# Patient Record
Sex: FEMALE | Race: WHITE | NOT HISPANIC OR LATINO | Employment: FULL TIME | URBAN - METROPOLITAN AREA
[De-identification: names, ages, dates, MRNs, and addresses within clinical notes are randomized per-mention and may not be internally consistent; named-entity substitution may affect disease eponyms.]

---

## 2017-01-03 DIAGNOSIS — Z00.00 ENCOUNTER FOR ROUTINE ADULT HEALTH EXAMINATION WITHOUT ABNORMAL FINDINGS: Primary | ICD-10-CM

## 2017-01-03 RX ORDER — NORETHINDRONE ACETATE AND ETHINYL ESTRADIOL .02; 1 MG/1; MG/1
1 TABLET ORAL DAILY
Qty: 84 TABLET | Refills: 0 | Status: SHIPPED | OUTPATIENT
Start: 2017-01-03 | End: 2017-01-19

## 2017-01-03 NOTE — TELEPHONE ENCOUNTER
Patient is overdue for OV for AFE.   No pending appointments at this time.  Per RN protocol, can only give 30 day supply. Patient normally receives 90 day supply from mail order pharmacy.     To provider to advise if ok to fill 90 and send letter to patient to make above appointment.    Stella Perez RN

## 2017-01-19 DIAGNOSIS — Z00.00 ENCOUNTER FOR ROUTINE ADULT HEALTH EXAMINATION WITHOUT ABNORMAL FINDINGS: Primary | ICD-10-CM

## 2017-01-19 RX ORDER — NORETHINDRONE ACETATE AND ETHINYL ESTRADIOL .02; 1 MG/1; MG/1
1 TABLET ORAL DAILY
Qty: 84 TABLET | Refills: 0 | Status: SHIPPED | OUTPATIENT
Start: 2017-01-19 | End: 2017-06-19 | Stop reason: SINTOL

## 2017-01-31 ENCOUNTER — OFFICE VISIT (OUTPATIENT)
Dept: OBGYN | Facility: CLINIC | Age: 22
End: 2017-01-31
Payer: COMMERCIAL

## 2017-01-31 VITALS
BODY MASS INDEX: 20.3 KG/M2 | HEIGHT: 63 IN | TEMPERATURE: 98.3 F | HEART RATE: 73 BPM | WEIGHT: 114.6 LBS | SYSTOLIC BLOOD PRESSURE: 129 MMHG | DIASTOLIC BLOOD PRESSURE: 80 MMHG

## 2017-01-31 DIAGNOSIS — N64.4 MASTODYNIA: Primary | ICD-10-CM

## 2017-01-31 LAB — B-HCG SERPL-ACNC: <1 IU/L

## 2017-01-31 PROCEDURE — 36415 COLL VENOUS BLD VENIPUNCTURE: CPT | Performed by: NURSE PRACTITIONER

## 2017-01-31 PROCEDURE — 84702 CHORIONIC GONADOTROPIN TEST: CPT | Performed by: NURSE PRACTITIONER

## 2017-01-31 PROCEDURE — 99213 OFFICE O/P EST LOW 20 MIN: CPT | Performed by: NURSE PRACTITIONER

## 2017-01-31 ASSESSMENT — PAIN SCALES - GENERAL: PAINLEVEL: NO PAIN (1)

## 2017-01-31 NOTE — NURSING NOTE
"Chief Complaint   Patient presents with     Breast Pain     Bilateral x 3 weeks       Initial /80 mmHg  Pulse 73  Temp(Src) 98.3  F (36.8  C) (Oral)  Ht 5' 2.75\" (1.594 m)  Wt 114 lb 9.6 oz (51.982 kg)  BMI 20.46 kg/m2  LMP 01/09/2017 (Exact Date) Estimated body mass index is 20.46 kg/(m^2) as calculated from the following:    Height as of this encounter: 5' 2.75\" (1.594 m).    Weight as of this encounter: 114 lb 9.6 oz (51.982 kg)..  BP completed using cuff size: janet Kitchen CMA      "

## 2017-01-31 NOTE — MR AVS SNAPSHOT
"              After Visit Summary   1/31/2017    Brielle Martinez    MRN: [id number]           Patient Information     Date Of Birth          1995        Visit Information        Provider Department      1/31/2017 8:10 AM Chiquita Julien APRN CNP [facility]        Today's Diagnoses     Mastodynia    -  1        Follow-ups after your visit        Who to contact     If you have questions or need follow up information about today's clinic visit or your schedule please contact [facility] directly at [phone].  Normal or non-critical lab and imaging results will be communicated to you by Mom Trustedhart, letter or phone within 4 business days after the clinic has received the results. If you do not hear from us within 7 days, please contact the clinic through PowerPlant or phone. If you have a critical or abnormal lab result, we will notify you by phone as soon as possible.  Submit refill requests through Fanli website or call your pharmacy and they will forward the refill request to us. Please allow 3 business days for your refill to be completed.          Additional Information About Your Visit        MyChart Information     Fanli website gives you secure access to your electronic health record. If you see a primary care provider, you can also send messages to your care team and make appointments. If you have questions, please call your primary care clinic.  If you do not have a primary care provider, please call [phone] and they will assist you.        Care EveryWhere ID     This is your Care EveryWhere ID. This could be used by other organizations to access your [facility] medical records  [id number]        Your Vitals Were     Pulse Temperature Height BMI (Body Mass Index) Last Period       73 98.3  F (36.8  C) (Oral) 5' 2.75\" (1.594 m) 20.46 kg/m2 01/09/2017 (Exact Date)        Blood Pressure from Last 3 Encounters:   01/31/17 129/80   04/23/16 118/69   01/07/16 134/75    Weight from Last " 3 Encounters:   01/31/17 114 lb 9.6 oz (51.982 kg)   04/23/16 116 lb 6.4 oz (52.799 kg)   01/07/16 121 lb 6.4 oz (55.067 kg)              We Performed the Following     HCG quantitative pregnancy        Primary Care Provider Office Phone # Fax #    Phyllis Burton PA-C [phone]       [facility] [address] [facility] [address]        Thank you!     Thank you for choosing [facility]  for your care. Our goal is always to provide you with excellent care. Hearing back from our patients is one way we can continue to improve our services. Please take a few minutes to complete the written survey that you may receive in the mail after your visit with us. Thank you!             Your Updated Medication List - Protect others around you: Learn how to safely use, store and throw away your medicines at www.disposemymeds.org.          This list is accurate as of: 1/31/17  8:22 AM.  Always use your most recent med list.                   Brand Name Dispense Instructions for use    norethindrone-ethinyl estradiol 1-20 MG-MCG per tablet    MICROGESTIN 1/20    84 tablet    Take 1 tablet by mouth daily Needs appt before more refills

## 2017-01-31 NOTE — PROGRESS NOTES
S: Brielle is a 21 year old  0 presenting today with concerns of bilateral breast tenderness and heaviness x 3-4 weeks. Both breasts feel the same. Started the week prior to her last menses on 17. Describes as heavy, full feeling. Possible slight increase in size. Denies lumps, masses, lymphadenopathy, nipple discharge. Drinks 1 cup coffee and a few cups green tea daily. Non smoker. She eats healthy and exercise 5 times weekly and this has not changed. Has been on the same pills for a long time. No recent missed or late pills. No recent antibiotic use. Patient did have bleeding again 17-1 day normal flow and then brown spotting. Never happened to her in the past. Is also having fatigue, feels more cramping. Last negative home pregnancy test was 1 week ago. Patient medical, surgical, social, and family history reviewed and updated at today's visit. ROS: 10 point ROS neg other than the symptoms noted above in the HPI.    O: This is a well appearing female in no acute distress. Answers questions and maintains eye contact appropriately. Vital signs noted.  CV: Regular rate and rhythm without murmur, gallop, rub  RESPIRATORY: Clear to auscultation bilaterally.  BREAST: Breasts are symmetrical without masses, lymphadenopathy, puckering, retraction, or nipple discharge bilaterally.    A/P:  (N64.4) Mastodynia  (primary encounter diagnosis)  Comment: Patient would like pregnancy test for reassurance. We discussed alternate possible etiologies of her symptoms as well. Recommend Vitamin E tablets for the next 4-8 weeks. Also, may consider getting fitted for a bra to ensure she has the correct size, especially if she feels the breasts are growing. Limit caffeine, increase water. May need to wear bra at night time as well. Patient will then monitor and see if it improves after her next cycle. I will notify her of the HCG results.  Plan: HCG quantitative pregnancy          Chiquita TREVIZO CNP

## 2017-06-19 ENCOUNTER — OFFICE VISIT (OUTPATIENT)
Dept: FAMILY MEDICINE | Facility: CLINIC | Age: 22
End: 2017-06-19
Payer: COMMERCIAL

## 2017-06-19 VITALS
HEART RATE: 78 BPM | TEMPERATURE: 98.4 F | BODY MASS INDEX: 20.53 KG/M2 | WEIGHT: 115 LBS | SYSTOLIC BLOOD PRESSURE: 122 MMHG | DIASTOLIC BLOOD PRESSURE: 79 MMHG

## 2017-06-19 DIAGNOSIS — N92.1 BREAKTHROUGH BLEEDING ON BIRTH CONTROL PILLS: Primary | ICD-10-CM

## 2017-06-19 DIAGNOSIS — N94.10 DYSPAREUNIA IN FEMALE: ICD-10-CM

## 2017-06-19 LAB
BETA HCG QUAL IFA URINE: NEGATIVE
MICRO REPORT STATUS: NORMAL
SPECIMEN SOURCE: NORMAL
WET PREP SPEC: NORMAL

## 2017-06-19 PROCEDURE — 87210 SMEAR WET MOUNT SALINE/INK: CPT | Performed by: PHYSICIAN ASSISTANT

## 2017-06-19 PROCEDURE — 84703 CHORIONIC GONADOTROPIN ASSAY: CPT | Performed by: PHYSICIAN ASSISTANT

## 2017-06-19 PROCEDURE — 87491 CHLMYD TRACH DNA AMP PROBE: CPT | Performed by: PHYSICIAN ASSISTANT

## 2017-06-19 PROCEDURE — 99213 OFFICE O/P EST LOW 20 MIN: CPT | Performed by: PHYSICIAN ASSISTANT

## 2017-06-19 PROCEDURE — 87591 N.GONORRHOEAE DNA AMP PROB: CPT | Performed by: PHYSICIAN ASSISTANT

## 2017-06-19 RX ORDER — LEVONORGESTREL AND ETHINYL ESTRADIOL 0.15-0.03
1 KIT ORAL DAILY
Qty: 84 TABLET | Refills: 3 | Status: SHIPPED | OUTPATIENT
Start: 2017-06-19 | End: 2018-03-28

## 2017-06-19 NOTE — MR AVS SNAPSHOT
After Visit Summary   6/19/2017    Brielle Martinez    MRN: [id number]           Patient Information     Date Of Birth          1995        Visit Information        Provider Department      6/19/2017 12:20 PM Kehr, Kristen M, PA-C [facility]        Today's Diagnoses     Breakthrough bleeding on birth control pills    -  1    Dyspareunia in female           Follow-ups after your visit        Who to contact     If you have questions or need follow up information about today's clinic visit or your schedule please contact [facility] directly at [phone].  Normal or non-critical lab and imaging results will be communicated to you by DocTreehart, letter or phone within 4 business days after the clinic has received the results. If you do not hear from us within 7 days, please contact the clinic through IntelliMatt or phone. If you have a critical or abnormal lab result, we will notify you by phone as soon as possible.  Submit refill requests through Etherstack or call your pharmacy and they will forward the refill request to us. Please allow 3 business days for your refill to be completed.          Additional Information About Your Visit        MyChart Information     Etherstack gives you secure access to your electronic health record. If you see a primary care provider, you can also send messages to your care team and make appointments. If you have questions, please call your primary care clinic.  If you do not have a primary care provider, please call [phone] and they will assist you.        Care EveryWhere ID     This is your Care EveryWhere ID. This could be used by other organizations to access your [facility] medical records  [id number]        Your Vitals Were     Pulse Temperature BMI (Body Mass Index)             78 98.4  F (36.9  C) (Oral) 20.53 kg/m2          Blood Pressure from Last 3 Encounters:   06/19/17 122/79   01/31/17 129/80   04/23/16 118/69    Weight from Last 3  Encounters:   06/19/17 115 lb (52.2 kg)   01/31/17 114 lb 9.6 oz (52 kg)   04/23/16 116 lb 6.4 oz (52.8 kg)              We Performed the Following     Beta HCG qual IFA urine     Chlamydia trachomatis PCR     Neisseria gonorrhoeae PCR     Wet prep          Today's Medication Changes          These changes are accurate as of: 6/19/17  2:32 PM.  If you have any questions, ask your nurse or doctor.               Start taking these medicines.        Dose/Directions    levonorgestrel-ethinyl estradiol 0.15-30 MG-MCG per tablet   Commonly known as:  NORDETTE   Used for:  Breakthrough bleeding on birth control pills   Started by:  Kehr, Kristen M, PA-C        Dose:  1 tablet   Take 1 tablet by mouth daily   Quantity:  84 tablet   Refills:  3         Stop taking these medicines if you haven't already. Please contact your care team if you have questions.     norethindrone-ethinyl estradiol 1-20 MG-MCG per tablet   Commonly known as:  MICROGESTIN 1/20   Stopped by:  Kehr, Kristen M, PA-C                Where to get your medicines      These medications were sent to [facility] [address], [facility] [address]  [id number] [place] [address]     Phone:  [phone]     levonorgestrel-ethinyl estradiol 0.15-30 MG-MCG per tablet                Primary Care Provider Office Phone # Fax #    Phyllis Burton PA-C [phone]       [address] [address]        Thank you!     Thank you for choosing [facility]  for your care. Our goal is always to provide you with excellent care. Hearing back from our patients is one way we can continue to improve our services. Please take a few minutes to complete the written survey that you may receive in the mail after your visit with us. Thank you!             Your Updated Medication List - Protect others around you: Learn how to safely use, store and throw away your medicines  at www.disposemymeds.org.          This list is accurate as of: 6/19/17  2:32 PM.  Always use your most recent med list.                   Brand Name Dispense Instructions for use    levonorgestrel-ethinyl estradiol 0.15-30 MG-MCG per tablet    FARRAH    84 tablet    Take 1 tablet by mouth daily

## 2017-06-19 NOTE — NURSING NOTE
"Chief Complaint   Patient presents with     Abnormal Bleeding Problem     discuss menstrual        Initial /79 (Cuff Size: Adult Regular)  Pulse 78  Temp 98.4  F (36.9  C) (Oral)  Wt 115 lb (52.2 kg)  BMI 20.53 kg/m2 Estimated body mass index is 20.53 kg/(m^2) as calculated from the following:    Height as of 1/31/17: 5' 2.75\" (1.594 m).    Weight as of this encounter: 115 lb (52.2 kg).  Medication Reconciliation: complete    ROSAURA Mcelroy MA    "

## 2017-06-19 NOTE — PROGRESS NOTES
SUBJECTIVE:                                                    Brielle Martinez is a 21 year old female who presents to clinic today for the following health issues:      Discuss menstrual- LMP was 10 days ago, patient is currently spotting.   She has been on her current dose of contraception for 2 years. She will have some spotting from time to time, but never this long. If she does have spotting, it is during the active pills. She then does not have a normal menstrual cycle. She has not missed a dose of the contraception. She is sexually active. She took a home HCG test a week ago, it was negative.   She also has some discomfort with intercourse. No other discharge. No dysuria.       Problem list and histories reviewed & adjusted, as indicated.  Additional history: as documented    Patient Active Problem List   Diagnosis     Contraception     Posttraumatic stress disorder, provisional     CARDIOVASCULAR SCREENING; LDL GOAL LESS THAN 160     Acne     Past Surgical History:   Procedure Laterality Date     NO HISTORY OF SURGERY         Social History   Substance Use Topics     Smoking status: Never Smoker     Smokeless tobacco: Never Used     Alcohol use Yes     Family History   Problem Relation Age of Onset     Hypertension Paternal Grandmother      Psychotic Disorder Sister      anxiety     Asthma Sister      Exercise induced asthma     Other Cancer Maternal Grandmother      great grandma cervical cancer         Current Outpatient Prescriptions   Medication Sig Dispense Refill     norethindrone-ethinyl estradiol (MICROGESTIN 1/20) 1-20 MG-MCG per tablet Take 1 tablet by mouth daily Needs appt before more refills 84 tablet 0     Allergies   Allergen Reactions     Keflex [Cephalexin]        Reviewed and updated as needed this visit by clinical staff  Tobacco  Allergies  Meds  Med Hx  Surg Hx  Fam Hx  Soc Hx      Reviewed and updated as needed this visit by Provider         ROS:  Constitutional, HEENT,  cardiovascular, pulmonary, gi and gu systems are negative, except as otherwise noted.    OBJECTIVE:                                                    /79 (Cuff Size: Adult Regular)  Pulse 78  Temp 98.4  F (36.9  C) (Oral)  Wt 115 lb (52.2 kg)  BMI 20.53 kg/m2  Body mass index is 20.53 kg/(m^2).  GENERAL: healthy, alert and no distress   (female): normal female external genitalia, normal urethral meatus , vaginal mucosa pink, moist, well rugated, normal cervix, adnexae, and uterus without masses. Brown discharge present  MS: no gross musculoskeletal defects noted, no edema  SKIN: no suspicious lesions or rashes  PSYCH: mentation appears normal, affect normal/bright    Diagnostic Test Results:  Results for orders placed or performed in visit on 06/19/17 (from the past 24 hour(s))   Wet prep   Result Value Ref Range    Specimen Description Vagina     Wet Prep       No Trichomonas seen  No yeast seen  No clue cells seen      Micro Report Status FINAL 06/19/2017    Beta HCG qual IFA urine   Result Value Ref Range    Beta HCG Qual IFA Urine Negative NEG        ASSESSMENT/PLAN:                                                      1. Breakthrough bleeding on birth control pills  2. Dyspareunia in female  New oral contraception.  UPT is negative.   Wet prep is negative.   GC / chlamydia tests pending.   I will contact her via Saunders Solutionst if all negative, otherwise we will contact her via telephone.   - Beta HCG qual IFA urine  - levonorgestrel-ethinyl estradiol (NORDETTE) 0.15-30 MG-MCG per tablet; Take 1 tablet by mouth daily  Dispense: 84 tablet; Refill: 3  - Wet prep  - Chlamydia trachomatis PCR  - Neisseria gonorrhoeae PCR      Kristen M. Kehr, PA-C  [facility]

## 2017-06-20 LAB
C TRACH DNA SPEC QL NAA+PROBE: NORMAL
N GONORRHOEA DNA SPEC QL NAA+PROBE: NORMAL
SPECIMEN SOURCE: NORMAL
SPECIMEN SOURCE: NORMAL

## 2018-02-15 ENCOUNTER — OFFICE VISIT (OUTPATIENT)
Dept: FAMILY MEDICINE | Facility: CLINIC | Age: 23
End: 2018-02-15
Payer: COMMERCIAL

## 2018-02-15 VITALS
WEIGHT: 111 LBS | HEART RATE: 106 BPM | SYSTOLIC BLOOD PRESSURE: 131 MMHG | BODY MASS INDEX: 19.82 KG/M2 | TEMPERATURE: 100.9 F | OXYGEN SATURATION: 97 % | DIASTOLIC BLOOD PRESSURE: 81 MMHG

## 2018-02-15 DIAGNOSIS — R68.89 FLU-LIKE SYMPTOMS: Primary | ICD-10-CM

## 2018-02-15 PROCEDURE — 99213 OFFICE O/P EST LOW 20 MIN: CPT | Performed by: FAMILY MEDICINE

## 2018-02-15 RX ORDER — OSELTAMIVIR PHOSPHATE 75 MG/1
75 CAPSULE ORAL 2 TIMES DAILY
Qty: 10 CAPSULE | Refills: 0 | Status: SHIPPED | OUTPATIENT
Start: 2018-02-15 | End: 2018-03-28

## 2018-02-15 NOTE — PROGRESS NOTES
SUBJECTIVE:   Brielle Martinez is a 22 year old female who presents to clinic today for the following health issues:      RESPIRATORY SYMPTOMS      Duration: 2 days    Description  nasal congestion, sore throat, cough, fever, chills, headache, fatigue/malaise and myalgias    Severity: moderate    Accompanying signs and symptoms: None    History (predisposing factors):  none    Precipitating or alleviating factors: None    Therapies tried and outcome:  rest and fluids multi symptom cold and flu    ptsd stable. No concerns per patient. No thoughts of harming self or others.  2 days ago started having sweats nauseous headache and coughing   Woke up worse today  No chest pain no shortness of breath  Mild sore throat she states from coughing  Chest pain or exertional shortness of breath: NO   Exposure to pertussis or pertussis like symptoms: No  Orthopnea, worsening edema, pnd: NO  Rash: NO  Tried OTC medications without relief  No hemoptysis.  Worsening symptoms hence patient came in to be seen     Problem list and histories reviewed & adjusted, as indicated.  Additional history: as documented    Problem list, Medication list, Allergies, and Medical/Social/Surgical histories reviewed in [facility] and updated as appropriate.    ROS:  Constitutional, HEENT, cardiovascular, pulmonary, gi and gu systems are negative, except as otherwise noted.    OBJECTIVE:                                                    /81  Pulse 106  Temp 100.9  F (38.3  C) (Oral)  Wt 111 lb (50.3 kg)  SpO2 97%  BMI 19.82 kg/m2  Body mass index is 19.82 kg/(m^2).  GENERAL: healthy, alert and no distress  EYES: pink palpebral conjunctiva, anicteric sclera  ENT: midline nasal septum normal ear exam. Normal   sinuses.   Mouth: moist buccal mucosa nonhyperemic posterior pharyngeal wall. No tonsillar enlargement or cellulitis  NECK: no adenopathy, no asymmetry, masses, or scars and thyroid normal to palpation  RESP: lungs clear to auscultation - No   rales, rhonchi or wheezes    CV: regular rate and rhythm, normal S1 S2, no S3 or S4,  No murmurs, click or rub  SKIN: no visible rashes noted  Pscyh: Appropriate mood and affect  MS: no gross musculoskeletal defects noted  Neuro: no meningeal signs     Diagnostic Test Results:  none      ASSESSMENT/PLAN:                                                        ICD-10-CM    1. Flu-like symptoms R68.89 Rapid strep screen     oseltamivir (TAMIFLU) 75 MG capsule     Offered strep test patient declined.   Prescribed with tamiflu  Risks vs benefits discussed  Alarm signs or symptoms discussed, if present recommend go to ER   Some flu like symptoms. Stay home until fever free for 24 hours.   Advised that prolonged cough > 3 weeks recommend chest xray, offered today, declined.   Adverse reactions of medications discussed.  Over the counter medications discussed.   Aware to come back in if with worsening symptoms or if no relief despite treatment plan  Patient voiced understanding and had no further questions.     MD Babs Cummins MD  [facility]

## 2018-02-15 NOTE — LETTER
[facility]  [address] [facility] [address]  Phone: [phone]    February 15, 2018        Brielle Martinez  [address] [facility] [address]          To whom it may concern:    RE: Brielle Martinez    Patient was seen and treated today at our clinic and missed work.  Patient needs to stay home until no more fevers for 24 hours without the help of tylenol or ibuprofen.  Patient may be sick for an additional 4-6 days.       Please contact me for questions or concerns.      Sincerely,        Babs Lewis MD

## 2018-02-15 NOTE — MR AVS SNAPSHOT
After Visit Summary   2/15/2018    Brielle Martinez    MRN: [id number]           Patient Information     Date Of Birth          1995        Visit Information        Provider Department      2/15/2018 11:00 AM Babs Lewis MD [facility]        Today's Diagnoses     Flu-like symptoms    -  1       Follow-ups after your visit        Who to contact     If you have questions or need follow up information about today's clinic visit or your schedule please contact [facility] directly at [phone].  Normal or non-critical lab and imaging results will be communicated to you by MyChart, letter or phone within 4 business days after the clinic has received the results. If you do not hear from us within 7 days, please contact the clinic through Campus Diariest or phone. If you have a critical or abnormal lab result, we will notify you by phone as soon as possible.  Submit refill requests through Joss Technology or call your pharmacy and they will forward the refill request to us. Please allow 3 business days for your refill to be completed.          Additional Information About Your Visit        MyChart Information     Joss Technology gives you secure access to your electronic health record. If you see a primary care provider, you can also send messages to your care team and make appointments. If you have questions, please call your primary care clinic.  If you do not have a primary care provider, please call [phone] and they will assist you.        Care EveryWhere ID     This is your Care EveryWhere ID. This could be used by other organizations to access your [facility] medical records  [id number]        Your Vitals Were     Pulse Temperature Pulse Oximetry BMI (Body Mass Index)          106 100.9  F (38.3  C) (Oral) 97% 19.82 kg/m2         Blood Pressure from Last 3 Encounters:   02/15/18 131/81   06/19/17 122/79   01/31/17 129/80    Weight from Last 3 Encounters:   02/15/18 111 lb  (50.3 kg)   06/19/17 115 lb (52.2 kg)   01/31/17 114 lb 9.6 oz (52 kg)              We Performed the Following     Rapid strep screen          Today's Medication Changes          These changes are accurate as of 2/15/18 11:43 AM.  If you have any questions, ask your nurse or doctor.               Start taking these medicines.        Dose/Directions    oseltamivir 75 MG capsule   Commonly known as:  TAMIFLU   Used for:  Flu-like symptoms   Started by:  Babs Lewis MD        Dose:  75 mg   Take 1 capsule (75 mg) by mouth 2 times daily   Quantity:  10 capsule   Refills:  0            Where to get your medicines      These medications were sent to [facility] [address] [facility], [facility] [address]  [address] [address], [facility] [address]     Phone:  [phone]     oseltamivir 75 MG capsule                Primary Care Provider Office Phone # Fax #    Phyllis Burton PA-C [phone] [address] [facility] [address]        Equal Access to Services     [facility]: Hadii chelita briscoe hadasho Sonaeem, waaxda luqadaha, qaybta kaalmada adeabbyyanils, brenda quintero . So [facility] [phone].    ATENCIÓN: Si habla español, tiene a caban disposición servicios gratuitos de asistencia lingüística. Llame al [phone].    We comply with applicable federal civil rights laws and [place] laws. We do not discriminate on the basis of race, color, national origin, age, disability, sex, sexual orientation, or gender identity.            Thank you!     Thank you for choosing [facility]  for your care. Our goal is always to provide you with excellent care. Hearing back from our patients is one way we can continue to improve our services. Please take a few minutes to complete the written survey that you may receive in the mail after your visit with us. Thank you!             Your Updated Medication List - Protect others around you:  Learn how to safely use, store and throw away your medicines at www.disposemymeds.org.          This list is accurate as of 2/15/18 11:43 AM.  Always use your most recent med list.                   Brand Name Dispense Instructions for use Diagnosis    levonorgestrel-ethinyl estradiol 0.15-30 MG-MCG per tablet    NORDETTE    84 tablet    Take 1 tablet by mouth daily    Breakthrough bleeding on birth control pills       oseltamivir 75 MG capsule    TAMIFLU    10 capsule    Take 1 capsule (75 mg) by mouth 2 times daily    Flu-like symptoms

## 2018-03-28 ENCOUNTER — OFFICE VISIT (OUTPATIENT)
Dept: FAMILY MEDICINE | Facility: CLINIC | Age: 23
End: 2018-03-28
Payer: COMMERCIAL

## 2018-03-28 VITALS
HEART RATE: 85 BPM | OXYGEN SATURATION: 99 % | BODY MASS INDEX: 20 KG/M2 | DIASTOLIC BLOOD PRESSURE: 71 MMHG | SYSTOLIC BLOOD PRESSURE: 112 MMHG | RESPIRATION RATE: 16 BRPM | WEIGHT: 112 LBS | TEMPERATURE: 97.8 F

## 2018-03-28 DIAGNOSIS — J06.9 VIRAL URI: Primary | ICD-10-CM

## 2018-03-28 DIAGNOSIS — Z30.41 ENCOUNTER FOR SURVEILLANCE OF CONTRACEPTIVE PILLS: ICD-10-CM

## 2018-03-28 DIAGNOSIS — R07.0 THROAT PAIN: ICD-10-CM

## 2018-03-28 LAB
DEPRECATED S PYO AG THROAT QL EIA: NORMAL
SPECIMEN SOURCE: NORMAL

## 2018-03-28 PROCEDURE — 87081 CULTURE SCREEN ONLY: CPT | Performed by: PHYSICIAN ASSISTANT

## 2018-03-28 PROCEDURE — 87880 STREP A ASSAY W/OPTIC: CPT | Performed by: PHYSICIAN ASSISTANT

## 2018-03-28 PROCEDURE — 99213 OFFICE O/P EST LOW 20 MIN: CPT | Performed by: PHYSICIAN ASSISTANT

## 2018-03-28 RX ORDER — LEVONORGESTREL AND ETHINYL ESTRADIOL 0.15-0.03
1 KIT ORAL DAILY
Qty: 84 TABLET | Refills: 3 | Status: SHIPPED | OUTPATIENT
Start: 2018-03-28 | End: 2019-02-08

## 2018-03-28 ASSESSMENT — PAIN SCALES - GENERAL: PAINLEVEL: NO PAIN (0)

## 2018-03-28 NOTE — MR AVS SNAPSHOT
After Visit Summary   3/28/2018    Brielle Martinez    MRN: [id number]           Patient Information     Date Of Birth          1995        Visit Information        Provider Department      3/28/2018 8:40 AM Kehr, Kristen M, PA-C [facility]        Today's Diagnoses     Viral URI    -  1    Encounter for surveillance of contraceptive pills        Throat pain           Follow-ups after your visit        Who to contact     If you have questions or need follow up information about today's clinic visit or your schedule please contact [facility] directly at [phone].  Normal or non-critical lab and imaging results will be communicated to you by Scouponhart, letter or phone within 4 business days after the clinic has received the results. If you do not hear from us within 7 days, please contact the clinic through CO-Valuet or phone. If you have a critical or abnormal lab result, we will notify you by phone as soon as possible.  Submit refill requests through Sprig or call your pharmacy and they will forward the refill request to us. Please allow 3 business days for your refill to be completed.          Additional Information About Your Visit        MyChart Information     Sprig gives you secure access to your electronic health record. If you see a primary care provider, you can also send messages to your care team and make appointments. If you have questions, please call your primary care clinic.  If you do not have a primary care provider, please call [phone] and they will assist you.        Care EveryWhere ID     This is your Care EveryWhere ID. This could be used by other organizations to access your [facility] medical records  [id number]        Your Vitals Were     Pulse Temperature Respirations Pulse Oximetry BMI (Body Mass Index)       85 97.8  F (36.6  C) (Oral) 16 99% 20 kg/m2        Blood Pressure from Last 3 Encounters:   03/28/18 112/71   02/15/18 131/81    06/19/17 122/79    Weight from Last 3 Encounters:   03/28/18 112 lb (50.8 kg)   02/15/18 111 lb (50.3 kg)   06/19/17 115 lb (52.2 kg)              We Performed the Following     Beta strep group A culture     Strep, Rapid Screen          Where to get your medicines      These medications were sent to [facility] - [place], [place] - [address]  [address] [address]     Phone:  [phone]     levonorgestrel-ethinyl estradiol 0.15-30 MG-MCG per tablet          Primary Care Provider Office Phone # Fax #    Phyllis Burton PA-C [phone] [address] [address] [place] [address]        Equal Access to Services     ARLET CONDE : Rc rodrigues Sonaeem, waaxda luqadaha, qaybta kaalmada ankuryanils, brenda quintero . So [facility] [phone].    ATENCIÓN: Si habla español, tiene a caban disposición servicios gratuitos de asistencia lingüística. [facility] [phone].    We comply with applicable federal civil rights laws and [place] laws. We do not discriminate on the basis of race, color, national origin, age, disability, sex, sexual orientation, or gender identity.            Thank you!     Thank you for choosing [facility]  for your care. Our goal is always to provide you with excellent care. Hearing back from our patients is one way we can continue to improve our services. Please take a few minutes to complete the written survey that you may receive in the mail after your visit with us. Thank you!             Your Updated Medication List - Protect others around you: Learn how to safely use, store and throw away your medicines at www.disposemymeds.org.          This list is accurate as of 3/28/18  9:14 AM.  Always use your most recent med list.                   Brand Name Dispense Instructions for use Diagnosis    levonorgestrel-ethinyl estradiol 0.15-30 MG-MCG per tablet    NORDETTE    84 tablet     Take 1 tablet by mouth daily    Encounter for surveillance of contraceptive pills

## 2018-03-28 NOTE — NURSING NOTE
"Chief Complaint   Patient presents with     Pharyngitis       Initial /71  Pulse 85  Temp 97.8  F (36.6  C) (Oral)  Resp 16  Wt 112 lb (50.8 kg)  SpO2 99%  BMI 20 kg/m2 Estimated body mass index is 20 kg/(m^2) as calculated from the following:    Height as of 1/31/17: 5' 2.75\" (1.594 m).    Weight as of this encounter: 112 lb (50.8 kg).  Medication Reconciliation: complete    ROSAURA Mcelroy MA    "

## 2018-03-28 NOTE — PROGRESS NOTES
SUBJECTIVE:   Brielle Martinez is a 22 year old female who presents to clinic today for the following health issues:      ENT Symptoms             Symptoms: cc Present Absent Comment   Fever/Chills  x  chills   Fatigue  x     Muscle Aches  x     Eye Irritation   x    Sneezing   x    Nasal Kike/Drg   x    Sinus Pressure/Pain  x     Loss of smell   x    Dental pain   x    Sore Throat  x     Swollen Glands  x     Ear Pain/Fullness  x  Left ear   Cough   x    Wheeze   x    Chest Pain   x    Shortness of breath  x     Rash   x    Other  x  headaches     Symptom duration:  2 days   Symptom severity:  severe   Treatments tried:  n/a   Contacts:           PROBLEMS TO ADD ON...  She needs to have a new prescription for her contraception at [facility]. Her insurance will not longer allow her to have the prescription filled at [facility] without charging her.     Problem list and histories reviewed & adjusted, as indicated.  Additional history: as documented      Current Outpatient Prescriptions   Medication Sig Dispense Refill     levonorgestrel-ethinyl estradiol (NORDETTE) 0.15-30 MG-MCG per tablet Take 1 tablet by mouth daily 84 tablet 3     [DISCONTINUED] levonorgestrel-ethinyl estradiol (NORDETTE) 0.15-30 MG-MCG per tablet Take 1 tablet by mouth daily 84 tablet 3     Allergies   Allergen Reactions     Keflex [Cephalexin]        Reviewed and updated as needed this visit by clinical staff       Reviewed and updated as needed this visit by Provider         ROS:  Constitutional, HEENT, cardiovascular, pulmonary, gi and gu systems are negative, except as otherwise noted.    OBJECTIVE:     /71  Pulse 85  Temp 97.8  F (36.6  C) (Oral)  Resp 16  Wt 112 lb (50.8 kg)  SpO2 99%  BMI 20 kg/m2  Body mass index is 20 kg/(m^2).  GENERAL: healthy, alert and no distress  EYES: Eyes grossly normal to inspection, PERRL and conjunctivae and sclerae normal  HENT: normal cephalic/atraumatic, ear canals and TM's normal, nose  and mouth without ulcers or lesions, oral mucous membranes moist and tonsillar erythema  NECK: no adenopathy, no asymmetry, masses, or scars and thyroid normal to palpation  RESP: lungs clear to auscultation - no rales, rhonchi or wheezes  CV: regular rate and rhythm, normal S1 S2, no S3 or S4, no murmur, click or rub, no peripheral edema and peripheral pulses strong  MS: no gross musculoskeletal defects noted, no edema  SKIN: no suspicious lesions or rashes  NEURO: Normal strength and tone, mentation intact and speech normal  PSYCH: mentation appears normal, affect normal/bright    Diagnostic Test Results:  Results for orders placed or performed in visit on 03/28/18 (from the past 24 hour(s))   Strep, Rapid Screen   Result Value Ref Range    Specimen Description Throat     Rapid Strep A Screen       NEGATIVE: No Group A streptococcal antigen detected by immunoassay, await culture report.       ASSESSMENT/PLAN:       1. Viral URI  Continue with symptomatic treatments with OTC medications also, rest and fluids.   Rapid strep screen is negative.   [facility] if symptoms worsen or persist.     2. Encounter for surveillance of contraceptive pills  Refills sent to [facility].   - levonorgestrel-ethinyl estradiol (NORDETTE) 0.15-30 MG-MCG per tablet; Take 1 tablet by mouth daily  Dispense: 84 tablet; Refill: 3      3. Throat pain  See above  - Strep, Rapid Screen  - Beta strep group A culture    Kristen M. Kehr, PA-C  [facility]

## 2018-03-29 LAB
BACTERIA SPEC CULT: NORMAL
SPECIMEN SOURCE: NORMAL

## 2018-04-01 ENCOUNTER — E-VISIT (OUTPATIENT)
Dept: FAMILY MEDICINE | Facility: CLINIC | Age: 23
End: 2018-04-01
Payer: COMMERCIAL

## 2018-04-01 DIAGNOSIS — J01.01 ACUTE RECURRENT MAXILLARY SINUSITIS: Primary | ICD-10-CM

## 2018-04-01 DIAGNOSIS — B37.31 CANDIDIASIS OF VULVA AND VAGINA: ICD-10-CM

## 2018-04-01 PROCEDURE — 99444 ZZC PHYSICIAN ONLINE EVALUATION & MANAGEMENT SERVICE: CPT | Performed by: PHYSICIAN ASSISTANT

## 2018-04-02 RX ORDER — FLUCONAZOLE 150 MG/1
150 TABLET ORAL ONCE
Qty: 1 TABLET | Refills: 0 | Status: SHIPPED | OUTPATIENT
Start: 2018-04-02 | End: 2019-07-12

## 2018-04-26 ENCOUNTER — MYC MEDICAL ADVICE (OUTPATIENT)
Dept: FAMILY MEDICINE | Facility: CLINIC | Age: 23
End: 2018-04-26

## 2018-06-08 NOTE — PATIENT INSTRUCTIONS
Please return fasting for cholesterol recheck, you can make a lab only appointment for this.  No food or drink other than water for 10 hours.          Preventive Health Recommendations  Female Ages 18 to 25     Yearly exam:     See your health care provider every year in order to  o Review health changes.   o Discuss preventive care.    o Review your medicines if your doctor has prescribed any.      You should be tested each year for STDs (sexually transmitted diseases).       After age 20, talk to your provider about how often you should have cholesterol testing.      Starting at age 21, get a Pap test every three years. If you have an abnormal result, your doctor may have you test more often.      If you are at risk for diabetes, you should have a diabetes test (fasting glucose).     Shots:     Get a flu shot each year.     Get a tetanus shot every 10 years.     Consider getting the shot (vaccine) that prevents cervical cancer (Gardasil).    Nutrition:     Eat at least 5 servings of fruits and vegetables each day.    Eat whole-grain bread, whole-wheat pasta and brown rice instead of white grains and rice.    Talk to your provider about Calcium and Vitamin D.     Lifestyle    Exercise at least 150 minutes a week each week (30 minutes a day, 5 days a week). This will help you control your weight and prevent disease.    Limit alcohol to one drink per day.    No smoking.     Wear sunscreen to prevent skin cancer.    See your dentist every six months for an exam and cleaning.

## 2018-06-08 NOTE — PROGRESS NOTES
SUBJECTIVE:   CC: Brielle Martinez is an 22 year old woman who presents for preventive health visit.     Healthy Habits:    Answers for HPI/ROS submitted by the patient on 6/26/2018   Annual Exam:  Getting at least 3 servings of Calcium per day:: Yes  Bi-annual eye exam:: Yes  Dental care twice a year:: Yes  Sleep apnea or symptoms of sleep apnea:: None  Diet:: Regular (no restrictions)  Frequency of exercise:: 2-3 days/week  Taking medications regularly:: Yes  Medication side effects:: Not applicable  Additional concerns today:: No  PHQ-2 Score: 0  Duration of exercise:: 30-45 minutes    Periods-on oral contraceptive pill.  Periods are very light.       Due for pap this year.   Getting  this weekend.     Not fasting, will return.  Will return for tetanus also.         Today's PHQ-2 Score:   PHQ-2 ( 1999 Pf[employer]) 6/26/2018 4/26/2018   Q1: Little interest or pleasure in doing things 0 0   Q2: Feeling down, depressed or hopeless 0 0   PHQ-2 Score 0 0   Q1: Little interest or pleasure in doing things Not at all -   Q2: Feeling down, depressed or hopeless Not at all -   PHQ-2 Score 0 -       Abuse: Current or Past(Physical, Sexual or Emotional)- No  Do you feel safe in your environment - Yes    Social History   Substance Use Topics     Smoking status: Never Smoker     Smokeless tobacco: Never Used     Alcohol use Yes     If you drink alcohol do you typically have >3 drinks per day or >7 drinks per week? No                     Reviewed orders with patient.  Reviewed health maintenance and updated orders accordingly - Yes  BP Readings from Last 3 Encounters:   06/26/18 119/76   03/28/18 112/71   02/15/18 131/81    Wt Readings from Last 3 Encounters:   06/26/18 113 lb (51.3 kg)   03/28/18 112 lb (50.8 kg)   02/15/18 111 lb (50.3 kg)                  Patient Active Problem List   Diagnosis     Contraception     Posttraumatic stress disorder, provisional     CARDIOVASCULAR SCREENING; LDL GOAL LESS THAN 160     Acne      Past Surgical History:   Procedure Laterality Date     NO HISTORY OF SURGERY         Social History   Substance Use Topics     Smoking status: Never Smoker     Smokeless tobacco: Never Used     Alcohol use Yes     Family History   Problem Relation Age of Onset     Hypertension Paternal Grandmother      Psychotic Disorder Sister      anxiety     Asthma Sister      Exercise induced asthma     Other Cancer Maternal Grandmother      great grandma cervical cancer         Current Outpatient Prescriptions   Medication Sig Dispense Refill     levonorgestrel-ethinyl estradiol (NORDETTE) 0.15-30 MG-MCG per tablet Take 1 tablet by mouth daily 84 tablet 3       Mammogram not appropriate for this patient based on age.    Pertinent mammograms are reviewed under the imaging tab.  History of abnormal Pap smear: NO - age 21-29 PAP every 3 years recommended    Reviewed and updated as needed this visit by clinical staff  Tobacco  Allergies  Meds  Med Hx  Surg Hx  Fam Hx  Soc Hx        Reviewed and updated as needed this visit by Provider        Past Medical History:   Diagnosis Date     CARDIOVASCULAR SCREENING; LDL GOAL LESS THAN 160 2013     Depression with anxiety      Panic attacks      Pyelonephritis      Strawberry nevus     above (R) ear      Past Surgical History:   Procedure Laterality Date     NO HISTORY OF SURGERY       Obstetric History       T0      L0     SAB0   TAB0   Ectopic0   Multiple0   Live Births0           ROS:  CONSTITUTIONAL: NEGATIVE for fever, chills, change in weight  INTEGUMENTARU/SKIN: NEGATIVE for worrisome rashes, moles or lesions  EYES: NEGATIVE for vision changes or irritation  ENT: NEGATIVE for ear, mouth and throat problems  RESP: NEGATIVE for significant cough or SOB  BREAST: NEGATIVE for masses, tenderness or discharge  CV: NEGATIVE for chest pain, palpitations or peripheral edema  GI: NEGATIVE for nausea, abdominal pain, heartburn, or change in bowel habits  :  "NEGATIVE for unusual urinary or vaginal symptoms. Periods are regular.  MUSCULOSKELETAL: NEGATIVE for significant arthralgias or myalgia  NEURO: NEGATIVE for weakness, dizziness or paresthesias  PSYCHIATRIC: NEGATIVE for changes in mood or affect    OBJECTIVE:   /76  Pulse 70  Temp 99.1  F (37.3  C) (Oral)  Resp 14  Ht 5' 1\" (1.549 m)  Wt 113 lb (51.3 kg)  LMP 05/26/2018 (Approximate)  SpO2 99%  Breastfeeding? No  BMI 21.35 kg/m2  EXAM:  GENERAL: healthy, alert and no distress  EYES: Eyes grossly normal to inspection, PERRL and conjunctivae and sclerae normal  HENT: ear canals and TM's normal, nose and mouth without ulcers or lesions  NECK: no adenopathy, no asymmetry, masses, or scars and thyroid normal to palpation  RESP: lungs clear to auscultation - no rales, rhonchi or wheezes  BREAST: normal without masses, tenderness or nipple discharge and no palpable axillary masses or adenopathy  CV: regular rate and rhythm, normal S1 S2, no S3 or S4, no murmur, click or rub, no peripheral edema and peripheral pulses strong  ABDOMEN: soft, nontender, no hepatosplenomegaly, no masses and bowel sounds normal   (female): normal female external genitalia, normal urethral meatus, vaginal mucosa pink, moist, well rugated, and normal cervix/adnexa/uterus without masses or discharge  MS: no gross musculoskeletal defects noted, no edema  SKIN: no suspicious lesions or rashes  NEURO: Normal strength and tone, mentation intact and speech normal  PSYCH: mentation appears normal, affect normal/bright    ASSESSMENT/PLAN:   1. Encounter for routine adult health examination without abnormal findings      2. Screening examination for venereal disease    - NEISSERIA GONORRHOEA PCR  - CHLAMYDIA TRACHOMATIS PCR    3. Screening for malignant neoplasm of cervix    - Pap imaged thin layer screen only - recommended age 21 - 24 years    4. Screening for HIV (human immunodeficiency virus)      5. Need for prophylactic vaccination " "with tetanus-diphtheria (TD)      6. Screening for diabetes mellitus    - Glucose; Future    7. Lipid screening    - Lipid panel reflex to direct LDL Fasting; Future    COUNSELING:   Reviewed preventive health counseling, as reflected in patient instructions       Regular exercise       Healthy diet/nutrition       Vision screening         reports that she has never smoked. She has never used smokeless tobacco.    Estimated body mass index is 21.35 kg/(m^2) as calculated from the following:    Height as of this encounter: 5' 1\" (1.549 m).    Weight as of this encounter: 113 lb (51.3 kg).     Patient Instructions   Please return fasting for cholesterol recheck, you can make a lab only appointment for this.  No food or drink other than water for 10 hours.          Preventive Health Recommendations  Female Ages 18 to 25     Yearly exam:     See your health care provider every year in order to  o Review health changes.   o Discuss preventive care.    o Review your medicines if your doctor has prescribed any.      You should be tested each year for STDs (sexually transmitted diseases).       After age 20, talk to your provider about how often you should have cholesterol testing.      Starting at age 21, get a Pap test every three years. If you have an abnormal result, your doctor may have you test more often.      If you are at risk for diabetes, you should have a diabetes test (fasting glucose).     Shots:     Get a flu shot each year.     Get a tetanus shot every 10 years.     Consider getting the shot (vaccine) that prevents cervical cancer (Gardasil).    Nutrition:     Eat at least 5 servings of fruits and vegetables each day.    Eat whole-grain bread, whole-wheat pasta and brown rice instead of white grains and rice.    Talk to your provider about Calcium and Vitamin D.     Lifestyle    Exercise at least 150 minutes a week each week (30 minutes a day, 5 days a week). This will help you control your weight and " prevent disease.    Limit alcohol to one drink per day.    No smoking.     Wear sunscreen to prevent skin cancer.    See your dentist every six months for an exam and cleaning.          Counseling Resources:  ATP IV Guidelines  Pooled Cohorts Equation Calculator  Breast Cancer Risk Calculator  FRAX Risk Assessment  ICSI Preventive Guidelines  Dietary Guidelines for Americans, 2010  USDA's MyPlate  ASA Prophylaxis  Lung CA Screening    Phyllis Burton PA-C  [facility]

## 2018-06-26 ENCOUNTER — OFFICE VISIT (OUTPATIENT)
Dept: FAMILY MEDICINE | Facility: CLINIC | Age: 23
End: 2018-06-26
Payer: COMMERCIAL

## 2018-06-26 VITALS
SYSTOLIC BLOOD PRESSURE: 119 MMHG | DIASTOLIC BLOOD PRESSURE: 76 MMHG | RESPIRATION RATE: 14 BRPM | TEMPERATURE: 99.1 F | BODY MASS INDEX: 21.34 KG/M2 | HEIGHT: 61 IN | HEART RATE: 70 BPM | WEIGHT: 113 LBS | OXYGEN SATURATION: 99 %

## 2018-06-26 DIAGNOSIS — Z13.1 SCREENING FOR DIABETES MELLITUS: ICD-10-CM

## 2018-06-26 DIAGNOSIS — Z11.3 SCREENING EXAMINATION FOR VENEREAL DISEASE: ICD-10-CM

## 2018-06-26 DIAGNOSIS — Z00.00 ENCOUNTER FOR ROUTINE ADULT HEALTH EXAMINATION WITHOUT ABNORMAL FINDINGS: Primary | ICD-10-CM

## 2018-06-26 DIAGNOSIS — Z11.4 SCREENING FOR HIV (HUMAN IMMUNODEFICIENCY VIRUS): ICD-10-CM

## 2018-06-26 DIAGNOSIS — Z12.4 SCREENING FOR MALIGNANT NEOPLASM OF CERVIX: ICD-10-CM

## 2018-06-26 DIAGNOSIS — Z13.220 LIPID SCREENING: ICD-10-CM

## 2018-06-26 DIAGNOSIS — Z23 NEED FOR PROPHYLACTIC VACCINATION WITH TETANUS-DIPHTHERIA (TD): ICD-10-CM

## 2018-06-26 PROCEDURE — 99395 PREV VISIT EST AGE 18-39: CPT | Performed by: PHYSICIAN ASSISTANT

## 2018-06-26 PROCEDURE — G0145 SCR C/V CYTO,THINLAYER,RESCR: HCPCS | Performed by: PHYSICIAN ASSISTANT

## 2018-06-26 PROCEDURE — 87591 N.GONORRHOEAE DNA AMP PROB: CPT | Performed by: PHYSICIAN ASSISTANT

## 2018-06-26 PROCEDURE — 87491 CHLMYD TRACH DNA AMP PROBE: CPT | Performed by: PHYSICIAN ASSISTANT

## 2018-06-26 NOTE — NURSING NOTE
"Chief Complaint   Patient presents with     Physical     AFE, Pt is not fasting and did not have labs done     Health Maintenance     orders pended        Initial /76  Pulse 70  Temp 99.1  F (37.3  C) (Oral)  Resp 14  Ht 5' 1\" (1.549 m)  Wt 113 lb (51.3 kg)  LMP 05/26/2018 (Approximate)  SpO2 99%  Breastfeeding? No  BMI 21.35 kg/m2 Estimated body mass index is 21.35 kg/(m^2) as calculated from the following:    Height as of this encounter: 5' 1\" (1.549 m).    Weight as of this encounter: 113 lb (51.3 kg).  Medication Reconciliation: complete      Bernard Wagner MA    "

## 2018-06-26 NOTE — MR AVS SNAPSHOT
After Visit Summary   6/26/2018    Brielle Martinez    MRN: [id number]           Patient Information     Date Of Birth          1995        Visit Information        Provider Department      6/26/2018 6:20 PM Phyllis Burton PA-C [facility]        Today's Diagnoses     Encounter for routine adult health examination without abnormal findings    -  1    Screening examination for venereal disease        Screening for malignant neoplasm of cervix        Screening for HIV (human immunodeficiency virus)        Need for prophylactic vaccination with tetanus-diphtheria (TD)        Screening for diabetes mellitus        Lipid screening          Care Instructions    Please return fasting for cholesterol recheck, you can make a lab only appointment for this.  No food or drink other than water for 10 hours.          Preventive Health Recommendations  Female Ages 18 to 25     Yearly exam:     See your health care provider every year in order to  o Review health changes.   o Discuss preventive care.    o Review your medicines if your doctor has prescribed any.      You should be tested each year for STDs (sexually transmitted diseases).       After age 20, talk to your provider about how often you should have cholesterol testing.      Starting at age 21, get a Pap test every three years. If you have an abnormal result, your doctor may have you test more often.      If you are at risk for diabetes, you should have a diabetes test (fasting glucose).     Shots:     Get a flu shot each year.     Get a tetanus shot every 10 years.     Consider getting the shot (vaccine) that prevents cervical cancer (Gardasil).    Nutrition:     Eat at least 5 servings of fruits and vegetables each day.    Eat whole-grain bread, whole-wheat pasta and brown rice instead of white grains and rice.    Talk to your provider about Calcium and Vitamin D.     Lifestyle    Exercise at least 150 minutes a week each week  "(30 minutes a day, 5 days a week). This will help you control your weight and prevent disease.    Limit alcohol to one drink per day.    No smoking.     Wear sunscreen to prevent skin cancer.    See your dentist every six months for an exam and cleaning.          Follow-ups after your visit        Future tests that were ordered for you today     Open Future Orders        Priority Expected Expires Ordered    Lipid panel reflex to direct LDL Fasting Routine  6/26/2019 6/26/2018    Glucose Routine  6/26/2019 6/26/2018            Who to contact     If you have questions or need follow up information about today's clinic visit or your schedule please contact [facility] directly at [phone].  Normal or non-critical lab and imaging results will be communicated to you by [employer], letter or phone within 4 business days after the clinic has received the results. If you do not hear from us within 7 days, please contact the clinic through webmet or phone. If you have a critical or abnormal lab result, we will notify you by phone as soon as possible.  Submit refill requests through [employer] or call your pharmacy and they will forward the refill request to us. Please allow 3 business days for your refill to be completed.          Additional Information About Your Visit        [employer] Information     [employer] gives you secure access to your electronic health record. If you see a primary care provider, you can also send messages to your care team and make appointments. If you have questions, please call your primary care clinic.  If you do not have a primary care provider, please call [phone] and they will assist you.        Care EveryWhere ID     This is your Care EveryWhere ID. This could be used by other organizations to access your [facility] medical records  [id number]        Your Vitals Were     Pulse Temperature Respirations Height Last Period Pulse Oximetry    70 99.1  F (37.3  C) (Oral) 14 5' 1\" " (1.549 m) 05/26/2018 (Approximate) 99%    Breastfeeding? BMI (Body Mass Index)                No 21.35 kg/m2           Blood Pressure from Last 3 Encounters:   06/26/18 119/76   03/28/18 112/71   02/15/18 131/81    Weight from Last 3 Encounters:   06/26/18 113 lb (51.3 kg)   03/28/18 112 lb (50.8 kg)   02/15/18 111 lb (50.3 kg)              We Performed the Following     CHLAMYDIA TRACHOMATIS PCR     NEISSERIA GONORRHOEA PCR     Pap imaged thin layer screen only - recommended age 21 - 24 years        Primary Care Provider Office Phone # Fax #    Phyllis Burton PA-C [phone]       [address] [facility] [address]        Equal Access to Services     ARLET CONDE : Hadii aad ku hadasho Sonaeem, waaxda luqadaha, qaybta kaalmada adeegyanils, brenda quintero . So [facility] [phone].    ATENCIÓN: Si habla español, tiene a caban disposición servicios gratuitos de asistencia lingüística. Llame al [phone].    We comply with applicable federal civil rights laws and [place] laws. We do not discriminate on the basis of race, color, national origin, age, disability, sex, sexual orientation, or gender identity.            Thank you!     Thank you for choosing [facility]  for your care. Our goal is always to provide you with excellent care. Hearing back from our patients is one way we can continue to improve our services. Please take a few minutes to complete the written survey that you may receive in the mail after your visit with us. Thank you!             Your Updated Medication List - Protect others around you: Learn how to safely use, store and throw away your medicines at www.disposemymeds.org.          This list is accurate as of 6/26/18  6:37 PM.  Always use your most recent med list.                   Brand Name Dispense Instructions for use Diagnosis    levonorgestrel-ethinyl estradiol 0.15-30 MG-MCG per tablet    FARRAH    84 tablet    Take 1  tablet by mouth daily    Encounter for surveillance of contraceptive pills

## 2018-06-26 NOTE — LETTER
[facility]  [address] [facility] [address]  [phone]        July 1, 2019    Brielle Pearson  [address] [facility] [address]              Dear Brielle Pearson    This is to remind you that your fasting lab work is due.    You may call our office at [phone] to schedule an appointment.    Please disregard this notice if you have already had your labs drawn or made an appointment.        Sincerely,        Phyllis ONEILL

## 2018-06-28 LAB
C TRACH DNA SPEC QL NAA+PROBE: NEGATIVE
N GONORRHOEA DNA SPEC QL NAA+PROBE: NEGATIVE
SPECIMEN SOURCE: NORMAL
SPECIMEN SOURCE: NORMAL

## 2018-06-28 NOTE — PROGRESS NOTES
Yelena Hannah,       Your recent test results are attached, if you have any questions or concerns please feel free to contact me via e-mail or call [phone].  Negative chlamydia/gonorrhea testing.        It was a pleasure to see you at your recent office visit.      Sincerely,  Phyllis Burton PA-C

## 2018-06-29 LAB
COPATH REPORT: NORMAL
PAP: NORMAL

## 2019-02-08 DIAGNOSIS — Z30.41 ENCOUNTER FOR SURVEILLANCE OF CONTRACEPTIVE PILLS: ICD-10-CM

## 2019-02-08 RX ORDER — LEVONORGESTREL AND ETHINYL ESTRADIOL 0.15-0.03
KIT ORAL
Qty: 84 TABLET | Refills: 1 | Status: ON HOLD | OUTPATIENT
Start: 2019-02-08 | End: 2021-08-22

## 2019-04-05 ENCOUNTER — OFFICE VISIT (OUTPATIENT)
Dept: FAMILY MEDICINE | Facility: CLINIC | Age: 24
End: 2019-04-05
Payer: COMMERCIAL

## 2019-04-05 VITALS
HEART RATE: 104 BPM | OXYGEN SATURATION: 98 % | HEIGHT: 62 IN | BODY MASS INDEX: 22.26 KG/M2 | TEMPERATURE: 98.7 F | DIASTOLIC BLOOD PRESSURE: 77 MMHG | RESPIRATION RATE: 18 BRPM | WEIGHT: 121 LBS | SYSTOLIC BLOOD PRESSURE: 145 MMHG

## 2019-04-05 DIAGNOSIS — R53.83 OTHER FATIGUE: ICD-10-CM

## 2019-04-05 DIAGNOSIS — J06.9 URI WITH COUGH AND CONGESTION: Primary | ICD-10-CM

## 2019-04-05 DIAGNOSIS — Z32.00 PREGNANCY EXAMINATION OR TEST, PREGNANCY UNCONFIRMED: ICD-10-CM

## 2019-04-05 LAB
BASOPHILS # BLD AUTO: 0 10E9/L (ref 0–0.2)
BASOPHILS NFR BLD AUTO: 0.5 %
DIFFERENTIAL METHOD BLD: NORMAL
EOSINOPHIL # BLD AUTO: 0 10E9/L (ref 0–0.7)
EOSINOPHIL NFR BLD AUTO: 0.5 %
ERYTHROCYTE [DISTWIDTH] IN BLOOD BY AUTOMATED COUNT: 12.5 % (ref 10–15)
HCG UR QL: NEGATIVE
HCT VFR BLD AUTO: 43.8 % (ref 35–47)
HGB BLD-MCNC: 15 G/DL (ref 11.7–15.7)
IRON SATN MFR SERPL: 9 % (ref 15–46)
IRON SERPL-MCNC: 35 UG/DL (ref 35–180)
LYMPHOCYTES # BLD AUTO: 2.1 10E9/L (ref 0.8–5.3)
LYMPHOCYTES NFR BLD AUTO: 33.3 %
MCH RBC QN AUTO: 29.1 PG (ref 26.5–33)
MCHC RBC AUTO-ENTMCNC: 34.2 G/DL (ref 31.5–36.5)
MCV RBC AUTO: 85 FL (ref 78–100)
MONOCYTES # BLD AUTO: 0.6 10E9/L (ref 0–1.3)
MONOCYTES NFR BLD AUTO: 9.3 %
NEUTROPHILS # BLD AUTO: 3.5 10E9/L (ref 1.6–8.3)
NEUTROPHILS NFR BLD AUTO: 56.4 %
PLATELET # BLD AUTO: 290 10E9/L (ref 150–450)
RBC # BLD AUTO: 5.16 10E12/L (ref 3.8–5.2)
TIBC SERPL-MCNC: 396 UG/DL (ref 240–430)
TSH SERPL DL<=0.005 MIU/L-ACNC: 2.86 MU/L (ref 0.4–4)
WBC # BLD AUTO: 6.2 10E9/L (ref 4–11)

## 2019-04-05 PROCEDURE — 84443 ASSAY THYROID STIM HORMONE: CPT | Performed by: NURSE PRACTITIONER

## 2019-04-05 PROCEDURE — 83550 IRON BINDING TEST: CPT | Performed by: NURSE PRACTITIONER

## 2019-04-05 PROCEDURE — 81025 URINE PREGNANCY TEST: CPT | Performed by: NURSE PRACTITIONER

## 2019-04-05 PROCEDURE — 82306 VITAMIN D 25 HYDROXY: CPT | Performed by: NURSE PRACTITIONER

## 2019-04-05 PROCEDURE — 36415 COLL VENOUS BLD VENIPUNCTURE: CPT | Performed by: NURSE PRACTITIONER

## 2019-04-05 PROCEDURE — 85025 COMPLETE CBC W/AUTO DIFF WBC: CPT | Performed by: NURSE PRACTITIONER

## 2019-04-05 PROCEDURE — 99213 OFFICE O/P EST LOW 20 MIN: CPT | Performed by: NURSE PRACTITIONER

## 2019-04-05 PROCEDURE — 83540 ASSAY OF IRON: CPT | Performed by: NURSE PRACTITIONER

## 2019-04-05 RX ORDER — AZITHROMYCIN 250 MG/1
TABLET, FILM COATED ORAL
Qty: 6 TABLET | Refills: 0 | Status: SHIPPED | OUTPATIENT
Start: 2019-04-05 | End: 2019-07-03

## 2019-04-05 ASSESSMENT — MIFFLIN-ST. JEOR: SCORE: 1258.48

## 2019-04-05 ASSESSMENT — PAIN SCALES - GENERAL: PAINLEVEL: SEVERE PAIN (7)

## 2019-04-05 NOTE — PROGRESS NOTES
SUBJECTIVE:   Brilele Pearson is a 23 year old female who presents to clinic today for the following health issues:      ENT Symptoms             Symptoms: cc Present Absent Comment   Fever/Chills  x  Patient states she has hot flashes   Fatigue  x     Muscle Aches   x    Eye Irritation   x    Sneezing  x     Nasal Kike/Drg  x     Sinus Pressure/Pain   x    Loss of smell  x     Dental pain   x    Sore Throat   x    Swollen Glands   x    Ear Pain/Fullness   x    Cough   x    Wheeze   x    Chest Pain   x    Shortness of breath  x     Rash   x    Other  x  Headache, nausea, diarrhea     Symptom duration:  3/24/19 almost two weeks   Symptom severity:  Getting worse with time 7/10   Treatments tried:  nasal rinse and mucinex- helped with stuffiness for a little bit   Contacts:   had a sinus infection       Problem list and histories reviewed & adjusted, as indicated.  Additional history: as documented    Patient Active Problem List   Diagnosis     Contraception     Posttraumatic stress disorder, provisional     CARDIOVASCULAR SCREENING; LDL GOAL LESS THAN 160     Acne     Past Surgical History:   Procedure Laterality Date     NO HISTORY OF SURGERY         Social History     Tobacco Use     Smoking status: Never Smoker     Smokeless tobacco: Never Used   Substance Use Topics     Alcohol use: Yes     Family History   Problem Relation Age of Onset     Hypertension Paternal Grandmother      Psychotic Disorder Sister         anxiety     Asthma Sister         Exercise induced asthma     Other Cancer Maternal Grandmother         great grandma cervical cancer         Current Outpatient Medications   Medication Sig Dispense Refill     azithromycin (ZITHROMAX) 250 MG tablet Take 2 tablets (500 mg) by mouth daily for 1 day, THEN 1 tablet (250 mg) daily for 4 days. 6 tablet 0     KURVELO 0.15-30 MG-MCG tablet TAKE 1 TABLET DAILY 84 tablet 1     Allergies   Allergen Reactions     Keflex [Cephalexin]      No lab results  "found.   BP Readings from Last 3 Encounters:   04/05/19 145/77   06/26/18 119/76   03/28/18 112/71    Wt Readings from Last 3 Encounters:   04/05/19 54.9 kg (121 lb)   06/26/18 51.3 kg (113 lb)   03/28/18 50.8 kg (112 lb)                  Labs reviewed in EPIC    Reviewed and updated as needed this visit by clinical staff  Tobacco  Allergies  Meds  Problems  Med Hx  Surg Hx  Fam Hx  Soc Hx        Reviewed and updated as needed this visit by Provider  Tobacco  Allergies  Meds  Problems  Med Hx  Surg Hx  Fam Hx         ROS:  Constitutional, HEENT, cardiovascular, pulmonary, GI, , musculoskeletal, neuro, skin, endocrine and psych systems are negative, except as otherwise noted.    OBJECTIVE:     /77   Pulse 104   Temp 98.7  F (37.1  C) (Tympanic)   Resp 18   Ht 1.585 m (5' 2.4\")   Wt 54.9 kg (121 lb)   SpO2 98%   BMI 21.85 kg/m    Body mass index is 21.85 kg/m .  GENERAL: healthy, alert and no distress  EYES: Eyes grossly normal to inspection, PERRL and conjunctivae and sclerae normal  HENT: ear canals and TM's normal, nose and mouth without ulcers or lesions POSITIVE for nasal mucosa edematous, erythematous, PND present, posterior oropharynx erythematous, ethmoid sinus pressure with palpation.   NECK: no adenopathy normal to palpation  RESP: lungs clear to auscultation - no rales, rhonchi or wheezes  CV: regular rate and rhythm, normal S1 S2, no S3 or S4, no murmur, click or rub, no peripheral edema and peripheral pulses strong  SKIN: no suspicious rashes    Diagnostic Test Results:  See orders    ASSESSMENT/PLAN:         ICD-10-CM    1. URI with cough and congestion J06.9 azithromycin (ZITHROMAX) 250 MG tablet    \"x 2 weeks with improvement then worsening of symptoms; with no full resolution of symptoms.  Current symptoms are the worset they have been\"   2. Other fatigue R53.83 CBC with platelets and differential     Iron and iron binding capacity     Vitamin D Deficiency     TSH with " free T4 reflex     HCG Qual, Urine (MKC7017)     CANCELED: Beta HCG qual IFA urine     CANCELED: HCG Qual, Urine-  [facility] (HND1685)   3. Pregnancy examination or test, pregnancy unconfirmed Z32.00 CANCELED: Beta HCG qual IFA urine       See Patient Instructions: Take full course of antibiotics.  Eat daily yogurt or take a probiotic.  If your symptoms persist or worsen please follow-up.    Mel Orellana, XAVIER  [facility] BOB

## 2019-04-05 NOTE — PATIENT INSTRUCTIONS
Take full course of antibiotics.  Eat daily yogurt or take a probiotic.  If your symptoms persist or worsen please follow-up.  Patient Education     Sinusitis (Antibiotic Treatment)    The sinuses are air-filled spaces within the bones of the face. They connect to the inside of the nose. Sinusitis is an inflammation of the tissue that lines the sinuses. Sinusitis can occur during a cold. It can also happen due to allergies to pollens and other particles in the air. Sinusitis can cause symptoms of sinus congestion and a feeling of fullness. A sinus infection causes fever, headache, and facial pain. There is often green or yellow fluid draining from the nose or into the back of the throat (post-nasal drip). You have been given antibiotics to treat this condition.  Home care    Take the full course of antibiotics as instructed. Do not stop taking them, even when you feel better.    Drink plenty of water, hot tea, and other liquids. This may help thin nasal mucus. It also may help your sinuses drain fluids.    Heat may help soothe painful areas of your face. Use a towel soaked in hot water. Or,  the shower and direct the warm spray onto your face. Using a vaporizer along with a menthol rub at night may also help soothe symptoms.     An expectorant with guaifenesin may help thin nasal mucus and help your sinuses drain fluids.    You can use an over-the-counter decongestant, unless a similar medicine was prescribed to you. Nasal sprays work the fastest. Use one that contains phenylephrine or oxymetazoline. First blow your nose gently. Then use the spray. Do not use these medicines more often than directed on the label. If you do, your symptoms may get worse. You may also take pills that contain pseudoephedrine. Don t use products that combine multiple medicines. This is because side effects may be increased. Read labels. You can also ask the pharmacist for help. (People with high blood pressure should not use  decongestants. They can raise blood pressure.)    Over-the-counter antihistamines may help if allergies contributed to your sinusitis.      Do not use nasal rinses or irrigation during an acute sinus infection, unless your healthcare provider tells you to. Rinsing may spread the infection to other areas in your sinuses.    Use acetaminophen or ibuprofen to control pain, unless another pain medicine was prescribed to you. If you have chronic liver or kidney disease or ever had a stomach ulcer, talk with your healthcare provider before using these medicines. (Aspirin should never be taken by anyone under age 18 who is ill with a fever. It may cause severe liver damage.)    Don't smoke. This can make symptoms worse.  Follow-up care  Follow up with your healthcare provider or our staff if you are better in 1 week.  When to seek medical advice  Call your healthcare provider if any of these occur:    Facial pain or headache that gets worse    Stiff neck    Unusual drowsiness or confusion    Swelling of your forehead or eyelids    Vision problems, such as blurred or double vision    Fever of 100.4 F (38 C) or higher, or as directed by your healthcare provider    Seizure    Breathing problems    Symptoms don't go away in 10 days  Prevention  Here are steps you can take to help prevent an infection:    Keep good hand washing habits.    Don t have close contact with people who have sore throats, colds, or other upper respiratory infections.    Don t smoke, and stay away from secondhand smoke.    Stay up to date with of your vaccines.  Date Last Reviewed: 11/1/2017 2000-2018 [employer]. [address] [place] [address], [place] [id number]. All rights reserved. This information is not intended as a substitute for professional medical care. Always follow your healthcare professional's instructions.

## 2019-04-05 NOTE — RESULT ENCOUNTER NOTE
Benny Hannah,    Thank you for your recent office visit.    Here are your recent results. I am so sorry for the confusion/ frustration.  I am always available to speak with you if you have questions or concerns.  You are not pregnant.  So far your blood labs are normal.  I will let you know the other results when I get them back.  Please take the full course of antibiotics.  Follow up if needed.    Feel free to contact me via [employer] or call the clinic at [phone].    Sincerely,    JOSELINE Ann, FNP-BC

## 2019-04-08 LAB — DEPRECATED CALCIDIOL+CALCIFEROL SERPL-MC: 66 UG/L (ref 20–75)

## 2019-04-11 ENCOUNTER — MYC MEDICAL ADVICE (OUTPATIENT)
Dept: FAMILY MEDICINE | Facility: CLINIC | Age: 24
End: 2019-04-11

## 2019-04-11 DIAGNOSIS — B37.31 YEAST INFECTION OF THE VAGINA: Primary | ICD-10-CM

## 2019-04-11 RX ORDER — FLUCONAZOLE 150 MG/1
150 TABLET ORAL ONCE
Qty: 1 TABLET | Refills: 0 | Status: SHIPPED | OUTPATIENT
Start: 2019-04-11 | End: 2019-07-03

## 2019-04-11 NOTE — RESULT ENCOUNTER NOTE
Benny Hannah,    Thank you for your recent office visit.    Here are your recent results.  Your iron is on the low side, consider taking a daily or every other day over the counter iron supplement. Other labs are normal.     Feel free to contact me via [employer] or call the clinic at [phone].    Sincerely,    Mel Orellana, JOSELINE, FNP-BC

## 2019-07-03 ENCOUNTER — OFFICE VISIT (OUTPATIENT)
Dept: FAMILY MEDICINE | Facility: CLINIC | Age: 24
End: 2019-07-03
Payer: COMMERCIAL

## 2019-07-03 ENCOUNTER — NURSE TRIAGE (OUTPATIENT)
Dept: FAMILY MEDICINE | Facility: CLINIC | Age: 24
End: 2019-07-03

## 2019-07-03 ENCOUNTER — ANCILLARY PROCEDURE (OUTPATIENT)
Dept: GENERAL RADIOLOGY | Facility: CLINIC | Age: 24
End: 2019-07-03
Attending: FAMILY MEDICINE
Payer: COMMERCIAL

## 2019-07-03 VITALS
OXYGEN SATURATION: 98 % | TEMPERATURE: 98.9 F | RESPIRATION RATE: 18 BRPM | SYSTOLIC BLOOD PRESSURE: 128 MMHG | DIASTOLIC BLOOD PRESSURE: 81 MMHG | WEIGHT: 124 LBS | BODY MASS INDEX: 22.39 KG/M2 | HEART RATE: 83 BPM

## 2019-07-03 DIAGNOSIS — R07.9 CHEST PAIN, UNSPECIFIED TYPE: ICD-10-CM

## 2019-07-03 DIAGNOSIS — R07.9 CHEST PAIN, UNSPECIFIED TYPE: Primary | ICD-10-CM

## 2019-07-03 PROCEDURE — 99214 OFFICE O/P EST MOD 30 MIN: CPT | Performed by: FAMILY MEDICINE

## 2019-07-03 PROCEDURE — 71046 X-RAY EXAM CHEST 2 VIEWS: CPT

## 2019-07-03 PROCEDURE — 93000 ELECTROCARDIOGRAM COMPLETE: CPT | Performed by: FAMILY MEDICINE

## 2019-07-03 RX ORDER — CETIRIZINE HYDROCHLORIDE 10 MG/1
10 TABLET ORAL DAILY
COMMUNITY
End: 2021-08-25

## 2019-07-03 ASSESSMENT — PAIN SCALES - GENERAL: PAINLEVEL: NO PAIN (0)

## 2019-07-03 NOTE — NURSING NOTE
"Chief Complaint   Patient presents with     Chest Pain     x 1 weeks, left side going up to the shoulder. sometimes can't take that deep breath     Health Maintenance     order pended, TD       Initial /81   Pulse 83   Temp 98.9  F (37.2  C) (Oral)   Resp 18   Wt 56.2 kg (124 lb)   SpO2 98%   BMI 22.39 kg/m   Estimated body mass index is 22.39 kg/m  as calculated from the following:    Height as of 4/5/19: 1.585 m (5' 2.4\").    Weight as of this encounter: 56.2 kg (124 lb).  Medication Reconciliation: complete  Adelaide Yanez, BERTHA  "

## 2019-07-03 NOTE — TELEPHONE ENCOUNTER
Patient reports no SOB, on left sided mostly feels likes muscle strain  When she pushes on it more tender but no specific injury. She is not really concerned about it.  Is going to [place] next week  Friday 7/12/19.  Was looking to get in  Sometime next week before she goes.  It has been about a week.  Made homemade kyler and made it worse. Feeling different since then.      Nursing action:  Patient was given an appointment today as listed below.  She was given signs and symptoms to look for to call 911 in the meantime.  Patientverbalizes good understanding, agrees with plan and states she needs no further support. Lucy Sandhu R.N.    Next 5 appointments (look out 90 days)    Jul 03, 2019  1:30 PM CDT  [facility] [address] [facility] [address] [phone]            Additional Information    Negative: Severe difficulty breathing (e.g., struggling for each breath, speaks in single words)    Negative: Passed out (i.e., fainted, collapsed and was not responding)    Negative: Chest pain lasting longer than 5 minutes and ANY of the following:* Over 50 years old* Over 30 years old and at least one cardiac risk factor (i.e., high blood pressure, diabetes, high cholesterol, obesity, smoker or strong family history of heart disease)* Pain is crushing, pressure-like, or heavy * Took nitroglycerin and chest pain was not relieved* History of heart disease (i.e., angina, heart attack, bypass surgery, angioplasty, CHF)    Negative: Visible sweat on face or sweat dripping down face    Negative: Sounds like a life-threatening emergency to the triager    Negative: Followed an injury to chest    Negative: SEVERE chest pain    Negative: Pain also present in shoulder(s) or arm(s) or jaw    Negative: Difficulty breathing    Negative: Cocaine use within last 3 days    Negative: History of prior 'blood clot' in leg or lungs (i.e., deep vein  "thrombosis, pulmonary embolism)    Negative: Recent illness requiring prolonged bed rest (i.e., immobilization)    Negative: Hip or leg fracture in past 2 months (e.g, or had cast on leg or ankle)    Negative: Major surgery in the past month    Negative: Recent long-distance travel with prolonged time in car, bus, plane, or train (i.e., within past 2 weeks; 6 or more hours duration)    Negative: Heart beating irregularly or very rapidly    Negative: Chest pain lasting longer than 5 minutes    Negative: Intermittent chest pain and pain has been increasing in severity or frequency    Negative: Dizziness or lightheadedness    Negative: Coughing up blood    Negative: Patient sounds very sick or weak to the triager    Negative: Fever > 100.5 F (38.1 C)    Negative: Intermittent chest pains persist > 3 days    Negative: All other patients with chest pain    Patient wants to be seen    Answer Assessment - Initial Assessment Questions  1. LOCATION: \"Where does it hurt?\"        More upper chest kind of near her arm pit left sided  2. RADIATION: \"Does the pain go anywhere else?\" (e.g., into neck, jaw, arms, back)      More left chest and into arm pit and doesn't travel from there  3. ONSET: \"When did the chest pain begin?\" (Minutes, hours or days)       1 week it is minor and constant is more noticeable when she eats  4. PATTERN \"Does the pain come and go, or has it been constant since it started?\"  \"Does it get worse with exertion?\"       See above - does not get worse with exertion   5. DURATION: \"How long does it last\" (e.g., seconds, minutes, hours)      constant  6. SEVERITY: \"How bad is the pain?\"  (e.g., Scale 1-10; mild, moderate, or severe)     - MILD (1-3): doesn't interfere with normal activities      - MODERATE (4-7): interferes with normal activities or awakens from sleep     - SEVERE (8-10): excruciating pain, unable to do any normal activities        mild  7. CARDIAC RISK FACTORS: \"Do you have any history of " "heart problems or risk factors for heart disease?\" (e.g., prior heart attack, angina; high blood pressure, diabetes, being overweight, high cholesterol, smoking, or strong family history of heart disease)      None dad side HTN but dad no hx bleeding blood clots  8. PULMONARY RISK FACTORS: \"Do you have any history of lung disease?\"  (e.g., blood clots in lung, asthma, emphysema, birth control pills)      She is on birth control no lung issues   9. CAUSE: \"What do you think is causing the chest pain?\"      Possible muscle strain but unaware of specific injury does do at home work out does (frequently does burpees and push ups)  10. OTHER SYMPTOMS: \"Do you have any other symptoms?\" (e.g., dizziness, nausea, vomiting, sweating, fever, difficulty breathing, cough)        None  11. PREGNANCY: \"Is there any chance you are pregnant?\" \"When was your last menstrual period?\"        Is on birth control    Protocols used: CHEST PAIN-A-OH      "

## 2019-07-03 NOTE — TELEPHONE ENCOUNTER
Please triage chest pain, sent last pm    ----- Message -----   From: Brielle Pearson   Sent: 7/2/2019   9:19 PM   To: An Reception   Subject: Appointment Request                                Appointment Request From: Brielle Pearson      With Provider: Phyllis Burton PA-C [[facility]]      Preferred Date Range: 7/8/2019 - 7/11/2019      Preferred Times: Any time      Reason for visit: Request an Appointment      Comments:   Experiencing chest pain

## 2019-07-03 NOTE — TELEPHONE ENCOUNTER
Left a message to return a call to [phone] asking that she return a call to me before we can move forward with her chest pain.  Lucy Sanhdu R.N.

## 2019-07-03 NOTE — PROGRESS NOTES
SUBJECTIVE:  Brielle Pearson is a 23 year old female who presents to the office with the CC of chest pain.    Patient complains of chest pain.    She reports that last Wednesday she ate a lot and had lot of red wine.   She reports that a few hours after that her symptom(s) started. She reports that she had a pain in the upper left chest.  She laid down and the symptom(s) were a little worse.   She reports that there was a pulling pain in the left upper chest.    The symptom(s) have been consisently present since then but can wax and wane.  It is usually worse after eating.     She does get heartburn symptom(s) couple time a weeks and this has been the case for years.   She takes tums and that usually helps in about 15-20 minutes     She does exercise in the form of running a couple of times a week. She usually goes for  1-2 miles and runs about 8 minute(s) miles    She reports that her allergies have been bad for a month(s)   Some times she has shortness of breath but not related to exercise       LMP on the 15th. She is taking oral contraceptive pill daily. She denies any history of DVT and denies any leg pain or swelling      Past Medical History:   Diagnosis Date     CARDIOVASCULAR SCREENING; LDL GOAL LESS THAN 160 8/23/2013     Depression with anxiety      Panic attacks      Pyelonephritis      Strawberry nevus     above (R) ear         Current Outpatient Medications:      KURVELO 0.15-30 MG-MCG tablet, TAKE 1 TABLET DAILY, Disp: 84 tablet, Rfl: 1    Social History     Tobacco Use     Smoking status: Never Smoker     Smokeless tobacco: Never Used   Substance Use Topics     Alcohol use: Yes         EXAM:  /81   Pulse 83   Temp 98.9  F (37.2  C) (Oral)   Resp 18   Wt 56.2 kg (124 lb)   SpO2 98%   BMI 22.39 kg/m    GENERAL APPEARANCE: healthy, alert and no distress  EYES: EOMI,  PERRL, conjunctiva clear  HENT: ear canals and TM's normal.  Nose and mouth without ulcers, erythema or lesions  NECK:  supple, nontender, no lymphadenopathy  RESP: lungs clear to auscultation - no rales, rhonchi or wheezes  CHEST WALL: the patient left upper chest was tender in comparison to the same area on the contralateral side.  CV: regular rates and rhythm, normal S1 S2, no murmur noted  ABDOMEN:  soft, nontender, no HSM or masses and bowel sounds normal  NEURO: Normal strength and tone, sensory exam grossly normal,  normal speech and mentation  SKIN: no suspicious lesions or rashes   EXTREMITIES: no edema non tender and negative Homans sign    Office EKG demonstrates:  Normal Sinus Rhythm, normal axis, normal intervals, no acute ST/T changes c/w ischemia, appears normal, NSR,normal axis, normal intervals, no acute ST/T changes c/w ischemia,no LVH by voltage criteria,unchanged from previous tracings      CHEST TWO VIEWS  7/3/2019 2:14 PM     HISTORY: Chest pain. Progress note describes pulling pain in left  upper chest.    COMPARISON: 1/17/2016 chest x-ray      Impression     IMPRESSION:   Heart and pulmonary vessels within normal limits. Lungs clear. No  pleural effusion. No evidence for pneumothorax.    MARCK ALVARADO MD             Assessment  / IMPRESSION  Chest pain likely musculoskeletal in nature    PLAN:  relafen prescription(s) written    Continue(s) to use the tums as needed for GERD symptom(s)   Follow up in 2 weeks if not resolved or significant(ly) better

## 2019-07-12 ENCOUNTER — E-VISIT (OUTPATIENT)
Dept: FAMILY MEDICINE | Facility: CLINIC | Age: 24
End: 2019-07-12

## 2019-07-12 DIAGNOSIS — N30.00 ACUTE CYSTITIS WITHOUT HEMATURIA: Primary | ICD-10-CM

## 2019-07-12 DIAGNOSIS — B37.31 CANDIDIASIS OF VULVA AND VAGINA: ICD-10-CM

## 2019-07-12 DIAGNOSIS — N30.00 ACUTE CYSTITIS WITHOUT HEMATURIA: ICD-10-CM

## 2019-07-12 PROCEDURE — 99444 ZZC PHYSICIAN ONLINE EVALUATION & MANAGEMENT SERVICE: CPT | Performed by: PHYSICIAN ASSISTANT

## 2019-07-12 PROCEDURE — 87086 URINE CULTURE/COLONY COUNT: CPT | Performed by: PHYSICIAN ASSISTANT

## 2019-07-12 RX ORDER — FLUCONAZOLE 150 MG/1
150 TABLET ORAL ONCE
Qty: 1 TABLET | Refills: 0 | Status: SHIPPED | OUTPATIENT
Start: 2019-07-12 | End: 2019-07-12

## 2019-07-12 RX ORDER — NITROFURANTOIN 25; 75 MG/1; MG/1
100 CAPSULE ORAL 2 TIMES DAILY
Qty: 14 CAPSULE | Refills: 0 | Status: SHIPPED | OUTPATIENT
Start: 2019-07-12 | End: 2019-07-19

## 2019-07-12 NOTE — TELEPHONE ENCOUNTER
Patient needs to drop off urine specimen for this or go to urgent care.   Please help coordinate whichever she wants.     jose eduardo

## 2019-07-12 NOTE — TELEPHONE ENCOUNTER
[place] and can't get off early.  She works until 4 pm and rides bus home.  She is leaving for [place] right away when she gets home today.    Discussed with Phyllis ONEILL who advises ok to treat with macorbid 100 mg bid for 7 days.  Prescription sent to pharmacy. Pt should try to come in and leave a urine for culture so we can confirm this is correct treatment.    I checked with Lucy in Lab who said it is ok for pt to come in this evening to leave urine.      Pt notified of plan and agrees to stop in to leave urine sample tonight.    Pt states she usually gets diflucan with antibiotics because they cause her to get yeast infections.  Can she have refill of diflucan?  Discussed with Phyllis ONEILL and prescription sent to pharmacy.    To provider to cosign for plan.  Vania Adrian BSN, RN

## 2019-07-13 LAB
BACTERIA SPEC CULT: NO GROWTH
SPECIMEN SOURCE: NORMAL

## 2019-08-06 ENCOUNTER — DOCUMENTATION ONLY (OUTPATIENT)
Dept: LAB | Facility: CLINIC | Age: 24
End: 2019-08-06

## 2019-08-06 NOTE — PROGRESS NOTES
On 19, we sent an over due lab letter to this patient and she has not made an appt. The tests are now  and have been canceled. If you would like her to return to the clinic for a lab only appt, please have your team contact her and place new Future orders.    Thank you, Lucy Harrison MLT

## 2019-08-27 NOTE — TELEPHONE ENCOUNTER
Pending Prescriptions:                       Disp   Refills    norethindrone-ethinyl estradiol (MICROGES*84 tab*0            Sig: Take 1 tablet by mouth daily Needs appt before           more refills      Edwige Bui MA       done

## 2019-11-03 ENCOUNTER — HEALTH MAINTENANCE LETTER (OUTPATIENT)
Age: 24
End: 2019-11-03

## 2020-11-16 ENCOUNTER — HEALTH MAINTENANCE LETTER (OUTPATIENT)
Age: 25
End: 2020-11-16

## 2021-08-19 LAB — GROUP B STREPTOCOCCUS (EXTERNAL): POSITIVE

## 2021-08-22 ENCOUNTER — HOSPITAL ENCOUNTER (OUTPATIENT)
Facility: CLINIC | Age: 26
Discharge: HOME | End: 2021-08-22
Attending: ADVANCED PRACTICE MIDWIFE | Admitting: ADVANCED PRACTICE MIDWIFE
Payer: COMMERCIAL

## 2021-08-22 VITALS
DIASTOLIC BLOOD PRESSURE: 74 MMHG | HEART RATE: 97 BPM | HEIGHT: 62 IN | WEIGHT: 179 LBS | RESPIRATION RATE: 18 BRPM | SYSTOLIC BLOOD PRESSURE: 128 MMHG | TEMPERATURE: 98 F | BODY MASS INDEX: 32.94 KG/M2

## 2021-08-22 DIAGNOSIS — R03.0 ELEVATED BLOOD PRESSURE READING WITHOUT DIAGNOSIS OF HYPERTENSION: Primary | ICD-10-CM

## 2021-08-22 PROBLEM — F32.A DEPRESSIVE DISORDER: Status: ACTIVE | Noted: 2021-06-22

## 2021-08-22 PROBLEM — Z34.90 PREGNANT: Status: ACTIVE | Noted: 2021-06-22

## 2021-08-22 PROBLEM — J45.909 ASTHMA: Status: ACTIVE | Noted: 2021-06-22

## 2021-08-22 PROBLEM — F41.9 ANXIETY: Status: ACTIVE | Noted: 2021-06-22

## 2021-08-22 LAB
ALT SERPL W P-5'-P-CCNC: 20 U/L (ref 0–50)
AST SERPL W P-5'-P-CCNC: 20 U/L (ref 0–45)
CREAT SERPL-MCNC: 0.56 MG/DL (ref 0.52–1.04)
CREAT UR-MCNC: 54 MG/DL
ERYTHROCYTE [DISTWIDTH] IN BLOOD BY AUTOMATED COUNT: 13.5 % (ref 10–15)
GFR SERPL CREATININE-BSD FRML MDRD: >90 ML/MIN/1.73M2
HCT VFR BLD AUTO: 39.7 % (ref 35–47)
HGB BLD-MCNC: 13.6 G/DL (ref 11.7–15.7)
MCH RBC QN AUTO: 28.3 PG (ref 26.5–33)
MCHC RBC AUTO-ENTMCNC: 34.3 G/DL (ref 31.5–36.5)
MCV RBC AUTO: 83 FL (ref 78–100)
PLATELET # BLD AUTO: 252 10E3/UL (ref 150–450)
PROT UR-MCNC: 0.1 G/L
PROT/CREAT 24H UR: 0.19 G/G CR (ref 0–0.2)
RBC # BLD AUTO: 4.8 10E6/UL (ref 3.8–5.2)
URATE SERPL-MCNC: 3.7 MG/DL (ref 2.6–6)
WBC # BLD AUTO: 13.9 10E3/UL (ref 4–11)

## 2021-08-22 PROCEDURE — 99213 OFFICE O/P EST LOW 20 MIN: CPT | Mod: 25 | Performed by: MIDWIFE

## 2021-08-22 PROCEDURE — 59025 FETAL NON-STRESS TEST: CPT | Mod: 26 | Performed by: MIDWIFE

## 2021-08-22 PROCEDURE — 84460 ALANINE AMINO (ALT) (SGPT): CPT | Performed by: ADVANCED PRACTICE MIDWIFE

## 2021-08-22 PROCEDURE — 82565 ASSAY OF CREATININE: CPT | Performed by: ADVANCED PRACTICE MIDWIFE

## 2021-08-22 PROCEDURE — 84156 ASSAY OF PROTEIN URINE: CPT | Performed by: ADVANCED PRACTICE MIDWIFE

## 2021-08-22 PROCEDURE — 84550 ASSAY OF BLOOD/URIC ACID: CPT | Performed by: ADVANCED PRACTICE MIDWIFE

## 2021-08-22 PROCEDURE — G0463 HOSPITAL OUTPT CLINIC VISIT: HCPCS | Mod: 25

## 2021-08-22 PROCEDURE — 36415 COLL VENOUS BLD VENIPUNCTURE: CPT | Performed by: ADVANCED PRACTICE MIDWIFE

## 2021-08-22 PROCEDURE — 59025 FETAL NON-STRESS TEST: CPT

## 2021-08-22 PROCEDURE — 84450 TRANSFERASE (AST) (SGOT): CPT | Performed by: ADVANCED PRACTICE MIDWIFE

## 2021-08-22 PROCEDURE — 85027 COMPLETE CBC AUTOMATED: CPT | Performed by: ADVANCED PRACTICE MIDWIFE

## 2021-08-22 RX ORDER — PRENATAL VIT/IRON FUM/FOLIC AC 27MG-0.8MG
1 TABLET ORAL DAILY
COMMUNITY

## 2021-08-22 RX ORDER — ACETAMINOPHEN 325 MG/1
650 TABLET ORAL EVERY 4 HOURS PRN
Status: DISCONTINUED | OUTPATIENT
Start: 2021-08-22 | End: 2021-08-23 | Stop reason: HOSPADM

## 2021-08-22 RX ORDER — LACTOBACILLUS RHAMNOSUS GG 10B CELL
1 CAPSULE ORAL 2 TIMES DAILY
COMMUNITY

## 2021-08-22 RX ORDER — FERROUS SULFATE 325(65) MG
325 TABLET ORAL
COMMUNITY

## 2021-08-22 RX ORDER — ONDANSETRON 2 MG/ML
4 INJECTION INTRAMUSCULAR; INTRAVENOUS EVERY 6 HOURS PRN
Status: DISCONTINUED | OUTPATIENT
Start: 2021-08-22 | End: 2021-08-23 | Stop reason: HOSPADM

## 2021-08-22 RX ORDER — METOCLOPRAMIDE HYDROCHLORIDE 5 MG/ML
10 INJECTION INTRAMUSCULAR; INTRAVENOUS EVERY 6 HOURS PRN
Status: DISCONTINUED | OUTPATIENT
Start: 2021-08-22 | End: 2021-08-23 | Stop reason: HOSPADM

## 2021-08-22 RX ORDER — PROCHLORPERAZINE 25 MG
25 SUPPOSITORY, RECTAL RECTAL EVERY 12 HOURS PRN
Status: DISCONTINUED | OUTPATIENT
Start: 2021-08-22 | End: 2021-08-23 | Stop reason: HOSPADM

## 2021-08-22 RX ORDER — PROCHLORPERAZINE MALEATE 10 MG
10 TABLET ORAL EVERY 6 HOURS PRN
Status: DISCONTINUED | OUTPATIENT
Start: 2021-08-22 | End: 2021-08-23 | Stop reason: HOSPADM

## 2021-08-22 RX ORDER — ONDANSETRON 4 MG/1
4 TABLET, ORALLY DISINTEGRATING ORAL EVERY 6 HOURS PRN
Status: DISCONTINUED | OUTPATIENT
Start: 2021-08-22 | End: 2021-08-23 | Stop reason: HOSPADM

## 2021-08-22 RX ORDER — CHLORAL HYDRATE 500 MG
2 CAPSULE ORAL DAILY
COMMUNITY

## 2021-08-22 RX ORDER — METOCLOPRAMIDE 10 MG/1
10 TABLET ORAL EVERY 6 HOURS PRN
Status: DISCONTINUED | OUTPATIENT
Start: 2021-08-22 | End: 2021-08-23 | Stop reason: HOSPADM

## 2021-08-22 ASSESSMENT — MIFFLIN-ST. JEOR: SCORE: 1500.19

## 2021-08-23 ENCOUNTER — TELEPHONE (OUTPATIENT)
Dept: OBGYN | Facility: CLINIC | Age: 26
End: 2021-08-23

## 2021-08-23 NOTE — DISCHARGE INSTRUCTIONS
Discharge Instruction for Undelivered Patients      You were seen for: Pre-Eclampsia Evaluation  We Consulted: Esha Kyle CNM  You had (Test or Medicine):NST, Pre-Eclampsia Labs    Diet:   Drink 8 to 12 glasses of liquids (milk, juice, water) every day.  You may eat meals and snacks.     Activity:  Count fetal kicks everyday (see handout)  Call your doctor or nurse midwife if your baby is moving less than usual.     Call your provider if you notice:  Swelling in your face or increased swelling in your hands or legs.  Headaches that are not relieved by Tylenol (acetaminophen).  Changes in your vision (blurring: seeing spots or stars.)  Nausea (sick to your stomach) and vomiting (throwing up).   Weight gain of 5 pounds or more per week.  Heartburn that doesn't go away.  Signs of bladder infection: pain when you urinate (use the toilet), need to go more often and more urgently.  The bag of bean (rupture of membranes) breaks, or you notice leaking in your underwear.  Bright red blood in your underwear.  Abdominal (lower belly) or stomach pain.  For first baby: Contractions (tightening) less than 5 minutes apart for one hour or more.  Increase or change in vaginal discharge (note the color and amount)      Follow-up:  Make an appointment to be seen on Tuesday 8/24/2021 at [facility]   [facility] Phone # [phone] (same number for nurse triage line)

## 2021-08-23 NOTE — TELEPHONE ENCOUNTER
Spoke with patient. Scheduled for BPP and MICHELET with CNM on 8/25. Ok to double book CNM schedule on 8/25 per Gabby Page. Scheduled time chosen to coordinate with availability of ultrasound.

## 2021-08-23 NOTE — PLAN OF CARE
Data: [facility] at 1737.   Reason for maternal/fetal assessment per patient is Rule Out Pre-eclampsia  . Patient is a . Prenatal record reviewed.      OB History    Para Term  AB Living   1 0 0 0 0 0   SAB TAB Ectopic Multiple Live Births   0 0 0 0 0      # Outcome Date GA Lbr Clinton/2nd Weight Sex Delivery Anes PTL Lv   1 Current               Medical History:   Past Medical History:   Diagnosis Date     Asthma 2021     CARDIOVASCULAR SCREENING; LDL GOAL LESS THAN 160 2013     Depression with anxiety      Depressive disorder 2021     Panic attacks      Pyelonephritis      Strawberry nevus     above (R) ear   . Gestational Age 37w0d. VSS. Cervix: not examined.  Fetal movement present. Patient denies cramping, backache, vaginal discharge, pelvic pressure, UTI symptoms, GI problems, bloody show, vaginal bleeding, edema, headache, visual disturbances, epigastric or URQ pain, abdominal pain, rupture of membranes. Support persons Porter present.  Action: Verbal consent for EFM. Triage assessment completed. EFM applied. Uterine assessment done. Fetal assessment: Presumed adequate fetal oxygenation documented (see flow record). Patient education pamphlets given on fetal movement counts and Gestational Hypertension handout. Patient instructed to report change in fetal movement, vaginal leaking of fluid or bleeding, abdominal pain, or any concerns related to the pregnancy to her nurse/physician.   Response: Esha Kyle CNM informed of arrival. Plan per provider is labs, NST. Patient verbalized understanding of education and verbalized agreement with plan. Discharged ambulatory at .

## 2021-08-23 NOTE — PROGRESS NOTES
HOSPITAL TRIAGE NOTE  ===================    CHIEF COMPLAINT  ========================  Brielle Pearson is a 26 year old patient presenting today at 37w0d for evaluation of hypertension disorder of pregnancy.    Patient's last menstrual period was 2020 (exact date).  Estimated Date of Delivery: Sep 12, 2021       HPI  ==================   Pt sent from [facility]  Due to elevated BP in clinic at 1600  126/99 for preeclampsia evaluation   Pt denies H/A blurred vision epigastric pain mild edema in hands  No nausea or vomiting  Feeling well      Pt had called her clinic after checking her BP at a [facility]     Prenatal record and labs reviewed from [place] papers faxed , through faxed records.    CONTRACTIONS: none  ABDOMINAL PAIN: none  FETAL MOVEMENT: active    VAGINAL BLEEDING: none  RUPTURE OF MEMBRANES: no  PELVIC PAIN: none    PREGNANCY COMPLICATIONS: none elevated BP in clinic today x 1     # Pain Assessment:  Current Pain Score 2021   Patient currently in pain? densandie Hannah s pain level was assessed and she currently denies pain.        REVIEW OF SYSTEMS  =====================  C: NEGATIVE for fever, chills  I: NEGATIVE for worrisome rashes, moles or lesions  E: NEGATIVE for vision changes or irritation  R: NEGATIVE for significant cough or SOB  CV: NEGATIVE for chest pain, palpitations or varicosities  GI: NEGATIVE for nausea, abdominal pain, heartburn, or change in bowel habits  : NEGATIVE for frequency, dysuria, or hematuria  M: NEGATIVE for significant arthralgias or myalgia  N: NEGATIVE for headache, weakness, dizziness or paresthesias  P: NEGATIVE for changes in mood or affect    PROBLEM LIST  ===============  Patient Active Problem List    Diagnosis Date Noted     Elevated BP without diagnosis of hypertension 2021     Priority: Medium     Anxiety 2021     Priority: Medium     Asthma 2021     Priority: Medium     Depressive disorder 2021     Priority: Medium      Pregnant 06/22/2021     Priority: Medium     Drug rash 11/02/2014     Priority: Medium     CARDIOVASCULAR SCREENING; LDL GOAL LESS THAN 160 08/23/2013     Priority: Medium     Posttraumatic stress disorder, provisional 02/13/2012     Priority: Medium     Contraception 03/22/2011     Priority: Medium       HISTORIES  ==============  ALLERGIES:      Allergies   Allergen Reactions     Keflex [Cephalexin]      PAST MEDICAL HISTORY  Past Medical History:   Diagnosis Date     Asthma 6/22/2021     CARDIOVASCULAR SCREENING; LDL GOAL LESS THAN 160 8/23/2013     Depression with anxiety      Depressive disorder 6/22/2021     Panic attacks      Pyelonephritis      Strawberry nevus     above (R) ear     SOCIAL HISTORY  Social History     Socioeconomic History     Marital status:      Spouse name: Not on file     Number of children: Not on file     Years of education: Not on file     Highest education level: Not on file   Occupational History     Not on file   Tobacco Use     Smoking status: Never Smoker     Smokeless tobacco: Never Used   Substance and Sexual Activity     Alcohol use: Not Currently     Drug use: No     Sexual activity: Yes     Partners: Male     Birth control/protection: Pull-out method, Pill   Other Topics Concern     Parent/sibling w/ CABG, MI or angioplasty before 65F 55M? No   Social History Narrative     Not on file     Social Determinants of Health     Financial Resource Strain:      Difficulty of Paying Living Expenses:    Food Insecurity:      Worried About Running Out of Food in the Last Year:      Ran Out of Food in the Last Year:    Transportation Needs:      Lack of Transportation (Medical):      Lack of Transportation (Non-Medical):    Physical Activity:      Days of Exercise per Week:      Minutes of Exercise per Session:    Stress:      Feeling of Stress :    Social Connections:      Frequency of Communication with Friends and Family:      Frequency of Social Gatherings with Friends and  "Family:      Attends Yarsanism Services:      Active Member of Clubs or Organizations:      Attends Club or Organization Meetings:      Marital Status:    Intimate Partner Violence:      Fear of Current or Ex-Partner:      Emotionally Abused:      Physically Abused:      Sexually Abused:      PARTNER: here     FAMILY HISTORY  Family History   Problem Relation Age of Onset     Hypertension Paternal Grandmother      Psychotic Disorder Sister         anxiety     Asthma Sister         Exercise induced asthma     Other Cancer Maternal Grandmother         great grandma cervical cancer     OB HISTORY  OB History    Para Term  AB Living   1 0 0 0 0 0   SAB TAB Ectopic Multiple Live Births   0 0 0 0 0      # Outcome Date GA Lbr Clinton/2nd Weight Sex Delivery Anes PTL Lv   1 Current              Prenatal Labs:   Lab Results   Component Value Date    HGB 13.6 2021       ULTRASOUND(s) reviewed:     EXAM  ============  Patient Vitals for the past 24 hrs:   BP Temp Temp src Pulse Resp Height Weight   21 128/74 -- -- -- -- -- --   21 124/71 -- -- -- -- -- --   21 1930 126/69 -- -- -- -- -- --   21 1915 130/72 -- -- -- -- -- --   21 1856 130/70 -- -- -- -- -- --   21 1845 132/75 -- -- -- -- -- --   21 1830 138/71 -- -- -- -- -- --   21 1815 124/72 -- -- -- -- -- --   21 1801 131/72 98  F (36.7  C) Oral 97 18 1.575 m (5' 2\") 81.2 kg (179 lb)   21 1800 131/72 -- -- -- -- -- --     /74   Pulse 97   Temp 98  F (36.7  C) (Oral)   Resp 18   Ht 1.575 m (5' 2\")   Wt 81.2 kg (179 lb)   LMP 2020 (Exact Date)   BMI 32.74 kg/m    GENERAL APPEARANCE: healthy, alert and no distress  RESP: lungs clear to auscultation - no rales, rhonchi or wheezes  CV: regular rates and rhythm, normal S1 S2, no S3 or S4 and no murmur,and no varicosities  ABDOMEN:  soft, nontender, no epigastric pain  SKIN: no suspicious lesions or rashes  NEURO: Denies " headache, blurred vision, other vision changes  PSYCH: mentation appears normal. and affect normal/bright  MS/ LEGS: Reflexes normal bilaterally    CONTRACTIONS: irreg   FETAL HEART TONES: continuous EFM- baseline 140 with moderate variability and positive accelerations. No decelerations.  NST: REACTIVE    PELVIC EXAM: deferred  PRESENTATION: VERTEX  BLOOD: no  DISCHARGE: none    ROM: no  ROMPLUS: not done  Lab Results   Component Value Date    ALT 20 08/22/2021     AST   Date Value Ref Range Status   08/22/2021 20 0 - 45 U/L Final     CBC RESULTS: Recent Labs   Lab Test 08/22/21  1854   WBC 13.9*   RBC 4.80   HGB 13.6   HCT 39.7   MCV 83   MCH 28.3   MCHC 34.3   RDW 13.5      Uric Acid 3.7  creatinine 0.56  Pro/creat ratio 0.190    LABS: HELLP labs- ALT, AST, creatinine, uric acid, CBC with platelets, Protein/Creatinine Ratio,   Lab results reviewed- [facility]   DIAGNOSIS  ============  37w0d [facility] the [facility] Triage for hypertension evaluation     NST: REACTIVE  Fetal Heart rate tracing:category one    PLAN  ============  Discharge to home with labor instuctions per discharge instruction form  Call or return to the [facility] with contractions, cramping, abdominal or pelvic pain, vaginal bleeding, leaking fluid or decreased fetal movement.  Follow up- closely  BP and BPP twice this week  Pt will call [facility] clinic in am for appts this week  Pt is aware if elevated /90 or greater or symptomatic would advise IOL   Couple is agreeable to this plan  Prefers to wait and watch closely with normal BPs , normal labs, reactive NST    JOSELINE Jacobo CNM

## 2021-08-23 NOTE — TELEPHONE ENCOUNTER
----- Message from JOSELINE Snyder CNCLOVER sent at 8/23/2021 11:28 AM CDT -----  Regarding: RE: pt will [facility] from [facility]  Hi!  Please double book on Wednesday. [place] is working with Gay in clinic  Gabby  ----- Message -----  From: Geeta Alberts RN  Sent: 8/23/2021   7:48 AM CDT  To: , #  Subject: FW: pt will [facility] from [facility]                      Please let us know how you would like to fit this patient in this week? There are no openings appropriate for MICHELET on the CNM schedule this week. We have the most options for BPP to be scheduled Wednesday or Friday this week. Thank you! KYLE Angela   ----- Message -----  From: Linette Kyle APRN CNCLOVER  Sent: 8/22/2021   8:27 PM CDT  To: [facility], #  Subject: pt will [facility] from [facility]                          This pt will need BPP and MICHELET visit   scheduled this week She was seen for R/O preeclampsia   27 yo G1 37 wks    Has had 1 elevated BP at [place].  Records scanned in   [facility]Ps [facility] with normal labs

## 2021-08-24 ASSESSMENT — ANXIETY QUESTIONNAIRES
2. NOT BEING ABLE TO STOP OR CONTROL WORRYING: NOT AT ALL
7. FEELING AFRAID AS IF SOMETHING AWFUL MIGHT HAPPEN: NOT AT ALL
3. WORRYING TOO MUCH ABOUT DIFFERENT THINGS: NOT AT ALL
GAD7 TOTAL SCORE: 0
1. FEELING NERVOUS, ANXIOUS, OR ON EDGE: NOT AT ALL
GAD7 TOTAL SCORE: 0
4. TROUBLE RELAXING: NOT AT ALL
7. FEELING AFRAID AS IF SOMETHING AWFUL MIGHT HAPPEN: NOT AT ALL
GAD7 TOTAL SCORE: 0
6. BECOMING EASILY ANNOYED OR IRRITABLE: NOT AT ALL
5. BEING SO RESTLESS THAT IT IS HARD TO SIT STILL: NOT AT ALL

## 2021-08-25 ENCOUNTER — OFFICE VISIT (OUTPATIENT)
Dept: OBGYN | Facility: CLINIC | Age: 26
End: 2021-08-25
Attending: ADVANCED PRACTICE MIDWIFE
Payer: COMMERCIAL

## 2021-08-25 ENCOUNTER — ANCILLARY PROCEDURE (OUTPATIENT)
Dept: ULTRASOUND IMAGING | Facility: CLINIC | Age: 26
End: 2021-08-25
Attending: MIDWIFE
Payer: COMMERCIAL

## 2021-08-25 ENCOUNTER — HOSPITAL ENCOUNTER (INPATIENT)
Facility: CLINIC | Age: 26
LOS: 3 days | Discharge: HOME | End: 2021-08-28
Attending: ADVANCED PRACTICE MIDWIFE | Admitting: MIDWIFE
Payer: COMMERCIAL

## 2021-08-25 VITALS
BODY MASS INDEX: 33.31 KG/M2 | HEIGHT: 62 IN | WEIGHT: 181 LBS | DIASTOLIC BLOOD PRESSURE: 87 MMHG | HEART RATE: 102 BPM | SYSTOLIC BLOOD PRESSURE: 143 MMHG

## 2021-08-25 DIAGNOSIS — O09.93 HRP (HIGH RISK PREGNANCY), THIRD TRIMESTER: Primary | ICD-10-CM

## 2021-08-25 DIAGNOSIS — O99.820 GBS (GROUP B STREPTOCOCCUS CARRIER), +RV CULTURE, CURRENTLY PREGNANT: ICD-10-CM

## 2021-08-25 DIAGNOSIS — R03.0 ELEVATED BP WITHOUT DIAGNOSIS OF HYPERTENSION: ICD-10-CM

## 2021-08-25 DIAGNOSIS — R03.0 ELEVATED BLOOD PRESSURE READING WITHOUT DIAGNOSIS OF HYPERTENSION: ICD-10-CM

## 2021-08-25 PROBLEM — Z34.90 PREGNANT: Status: RESOLVED | Noted: 2021-06-22 | Resolved: 2021-08-25

## 2021-08-25 PROBLEM — O13.3 GESTATIONAL HYPERTENSION, THIRD TRIMESTER: Status: ACTIVE | Noted: 2021-08-22

## 2021-08-25 LAB
ABO/RH(D): NORMAL
ALT SERPL W P-5'-P-CCNC: 19 U/L (ref 0–50)
ANION GAP SERPL CALCULATED.3IONS-SCNC: 7 MMOL/L (ref 3–14)
ANTIBODY SCREEN: NEGATIVE
AST SERPL W P-5'-P-CCNC: 22 U/L (ref 0–45)
BASOPHILS # BLD AUTO: 0.1 10E3/UL (ref 0–0.2)
BASOPHILS NFR BLD AUTO: 0 %
BUN SERPL-MCNC: 7 MG/DL (ref 7–30)
CALCIUM SERPL-MCNC: 8.8 MG/DL (ref 8.5–10.1)
CHLORIDE BLD-SCNC: 109 MMOL/L (ref 94–109)
CO2 SERPL-SCNC: 22 MMOL/L (ref 20–32)
CREAT SERPL-MCNC: 0.54 MG/DL (ref 0.52–1.04)
CREAT UR-MCNC: 50 MG/DL
EOSINOPHIL # BLD AUTO: 0.1 10E3/UL (ref 0–0.7)
EOSINOPHIL NFR BLD AUTO: 1 %
ERYTHROCYTE [DISTWIDTH] IN BLOOD BY AUTOMATED COUNT: 13.6 % (ref 10–15)
GFR SERPL CREATININE-BSD FRML MDRD: >90 ML/MIN/1.73M2
GLUCOSE BLD-MCNC: 76 MG/DL (ref 70–99)
HCT VFR BLD AUTO: 40.9 % (ref 35–47)
HGB BLD-MCNC: 13.7 G/DL (ref 11.7–15.7)
IMM GRANULOCYTES # BLD: 0.2 10E3/UL
IMM GRANULOCYTES NFR BLD: 2 %
LYMPHOCYTES # BLD AUTO: 2.4 10E3/UL (ref 0.8–5.3)
LYMPHOCYTES NFR BLD AUTO: 20 %
MCH RBC QN AUTO: 27.9 PG (ref 26.5–33)
MCHC RBC AUTO-ENTMCNC: 33.5 G/DL (ref 31.5–36.5)
MCV RBC AUTO: 83 FL (ref 78–100)
MONOCYTES # BLD AUTO: 0.9 10E3/UL (ref 0–1.3)
MONOCYTES NFR BLD AUTO: 8 %
NEUTROPHILS # BLD AUTO: 8.6 10E3/UL (ref 1.6–8.3)
NEUTROPHILS NFR BLD AUTO: 69 %
NRBC # BLD AUTO: 0 10E3/UL
NRBC BLD AUTO-RTO: 0 /100
PLATELET # BLD AUTO: 262 10E3/UL (ref 150–450)
POTASSIUM BLD-SCNC: 3.8 MMOL/L (ref 3.4–5.3)
PROT UR-MCNC: 0.11 G/L
PROT/CREAT 24H UR: 0.22 G/G CR (ref 0–0.2)
RBC # BLD AUTO: 4.91 10E6/UL (ref 3.8–5.2)
SARS-COV-2 RNA RESP QL NAA+PROBE: NEGATIVE
SODIUM SERPL-SCNC: 138 MMOL/L (ref 133–144)
SPECIMEN EXPIRATION DATE: NORMAL
URATE SERPL-MCNC: 4 MG/DL (ref 2.6–6)
WBC # BLD AUTO: 12.2 10E3/UL (ref 4–11)

## 2021-08-25 PROCEDURE — 76819 FETAL BIOPHYS PROFIL W/O NST: CPT

## 2021-08-25 PROCEDURE — 84156 ASSAY OF PROTEIN URINE: CPT | Performed by: MIDWIFE

## 2021-08-25 PROCEDURE — 84550 ASSAY OF BLOOD/URIC ACID: CPT | Performed by: MIDWIFE

## 2021-08-25 PROCEDURE — G0463 HOSPITAL OUTPT CLINIC VISIT: HCPCS | Mod: 25

## 2021-08-25 PROCEDURE — 76819 FETAL BIOPHYS PROFIL W/O NST: CPT | Mod: 26 | Performed by: OBSTETRICS & GYNECOLOGY

## 2021-08-25 PROCEDURE — 250N000013 HC RX MED GY IP 250 OP 250 PS 637: Performed by: MIDWIFE

## 2021-08-25 PROCEDURE — 86780 TREPONEMA PALLIDUM: CPT | Performed by: MIDWIFE

## 2021-08-25 PROCEDURE — 3E0P7VZ INTRODUCTION OF HORMONE INTO FEMALE REPRODUCTIVE, VIA NATURAL OR ARTIFICIAL OPENING: ICD-10-PCS | Performed by: MIDWIFE

## 2021-08-25 PROCEDURE — 85025 COMPLETE CBC W/AUTO DIFF WBC: CPT | Performed by: MIDWIFE

## 2021-08-25 PROCEDURE — 86901 BLOOD TYPING SEROLOGIC RH(D): CPT | Performed by: MIDWIFE

## 2021-08-25 PROCEDURE — 36415 COLL VENOUS BLD VENIPUNCTURE: CPT | Performed by: MIDWIFE

## 2021-08-25 PROCEDURE — 84450 TRANSFERASE (AST) (SGOT): CPT | Performed by: MIDWIFE

## 2021-08-25 PROCEDURE — 84460 ALANINE AMINO (ALT) (SGPT): CPT | Performed by: MIDWIFE

## 2021-08-25 PROCEDURE — 99213 OFFICE O/P EST LOW 20 MIN: CPT | Mod: 25 | Performed by: ADVANCED PRACTICE MIDWIFE

## 2021-08-25 PROCEDURE — 80048 BASIC METABOLIC PNL TOTAL CA: CPT | Performed by: MIDWIFE

## 2021-08-25 PROCEDURE — 120N000002 HC R&B MED SURG/OB UMMC

## 2021-08-25 PROCEDURE — 87635 SARS-COV-2 COVID-19 AMP PRB: CPT | Performed by: MIDWIFE

## 2021-08-25 RX ORDER — MISOPROSTOL 200 UG/1
800 TABLET ORAL
Status: DISCONTINUED | OUTPATIENT
Start: 2021-08-25 | End: 2021-08-26 | Stop reason: HOSPADM

## 2021-08-25 RX ORDER — PENICILLIN G 3000000 [IU]/50ML
3 INJECTION, SOLUTION INTRAVENOUS EVERY 4 HOURS
Status: DISCONTINUED | OUTPATIENT
Start: 2021-08-26 | End: 2021-08-26

## 2021-08-25 RX ORDER — FENTANYL CITRATE 50 UG/ML
50-100 INJECTION, SOLUTION INTRAMUSCULAR; INTRAVENOUS
Status: DISCONTINUED | OUTPATIENT
Start: 2021-08-25 | End: 2021-08-26 | Stop reason: HOSPADM

## 2021-08-25 RX ORDER — PENICILLIN G POTASSIUM 5000000 [IU]/1
5 INJECTION, POWDER, FOR SOLUTION INTRAMUSCULAR; INTRAVENOUS ONCE
Status: COMPLETED | OUTPATIENT
Start: 2021-08-25 | End: 2021-08-26

## 2021-08-25 RX ORDER — ONDANSETRON 2 MG/ML
4 INJECTION INTRAMUSCULAR; INTRAVENOUS EVERY 6 HOURS PRN
Status: DISCONTINUED | OUTPATIENT
Start: 2021-08-25 | End: 2021-08-26

## 2021-08-25 RX ORDER — OXYTOCIN/0.9 % SODIUM CHLORIDE 30/500 ML
340 PLASTIC BAG, INJECTION (ML) INTRAVENOUS CONTINUOUS PRN
Status: DISCONTINUED | OUTPATIENT
Start: 2021-08-25 | End: 2021-08-26 | Stop reason: HOSPADM

## 2021-08-25 RX ORDER — KETOROLAC TROMETHAMINE 30 MG/ML
30 INJECTION, SOLUTION INTRAMUSCULAR; INTRAVENOUS
Status: DISCONTINUED | OUTPATIENT
Start: 2021-08-25 | End: 2021-08-26

## 2021-08-25 RX ORDER — NALOXONE HYDROCHLORIDE 0.4 MG/ML
0.4 INJECTION, SOLUTION INTRAMUSCULAR; INTRAVENOUS; SUBCUTANEOUS
Status: DISCONTINUED | OUTPATIENT
Start: 2021-08-25 | End: 2021-08-26 | Stop reason: HOSPADM

## 2021-08-25 RX ORDER — METOCLOPRAMIDE 10 MG/1
10 TABLET ORAL EVERY 6 HOURS PRN
Status: DISCONTINUED | OUTPATIENT
Start: 2021-08-25 | End: 2021-08-26 | Stop reason: HOSPADM

## 2021-08-25 RX ORDER — NALOXONE HYDROCHLORIDE 0.4 MG/ML
0.2 INJECTION, SOLUTION INTRAMUSCULAR; INTRAVENOUS; SUBCUTANEOUS
Status: DISCONTINUED | OUTPATIENT
Start: 2021-08-25 | End: 2021-08-26 | Stop reason: HOSPADM

## 2021-08-25 RX ORDER — OXYTOCIN 10 [USP'U]/ML
10 INJECTION, SOLUTION INTRAMUSCULAR; INTRAVENOUS
Status: DISCONTINUED | OUTPATIENT
Start: 2021-08-25 | End: 2021-08-26 | Stop reason: HOSPADM

## 2021-08-25 RX ORDER — PROCHLORPERAZINE 25 MG
25 SUPPOSITORY, RECTAL RECTAL EVERY 12 HOURS PRN
Status: DISCONTINUED | OUTPATIENT
Start: 2021-08-25 | End: 2021-08-26 | Stop reason: HOSPADM

## 2021-08-25 RX ORDER — OXYTOCIN 10 [USP'U]/ML
10 INJECTION, SOLUTION INTRAMUSCULAR; INTRAVENOUS
Status: DISCONTINUED | OUTPATIENT
Start: 2021-08-25 | End: 2021-08-26

## 2021-08-25 RX ORDER — PROCHLORPERAZINE MALEATE 10 MG
10 TABLET ORAL EVERY 6 HOURS PRN
Status: DISCONTINUED | OUTPATIENT
Start: 2021-08-25 | End: 2021-08-26 | Stop reason: HOSPADM

## 2021-08-25 RX ORDER — MISOPROSTOL 100 UG/1
25 TABLET ORAL EVERY 4 HOURS PRN
Status: DISCONTINUED | OUTPATIENT
Start: 2021-08-25 | End: 2021-08-26 | Stop reason: HOSPADM

## 2021-08-25 RX ORDER — METOCLOPRAMIDE HYDROCHLORIDE 5 MG/ML
10 INJECTION INTRAMUSCULAR; INTRAVENOUS EVERY 6 HOURS PRN
Status: DISCONTINUED | OUTPATIENT
Start: 2021-08-25 | End: 2021-08-26 | Stop reason: HOSPADM

## 2021-08-25 RX ORDER — ONDANSETRON 4 MG/1
4 TABLET, ORALLY DISINTEGRATING ORAL EVERY 6 HOURS PRN
Status: DISCONTINUED | OUTPATIENT
Start: 2021-08-25 | End: 2021-08-26

## 2021-08-25 RX ORDER — TRANEXAMIC ACID 10 MG/ML
1 INJECTION, SOLUTION INTRAVENOUS EVERY 30 MIN PRN
Status: DISCONTINUED | OUTPATIENT
Start: 2021-08-25 | End: 2021-08-26 | Stop reason: HOSPADM

## 2021-08-25 RX ORDER — MISOPROSTOL 200 UG/1
400 TABLET ORAL
Status: DISCONTINUED | OUTPATIENT
Start: 2021-08-25 | End: 2021-08-26 | Stop reason: HOSPADM

## 2021-08-25 RX ORDER — OXYTOCIN/0.9 % SODIUM CHLORIDE 30/500 ML
100-340 PLASTIC BAG, INJECTION (ML) INTRAVENOUS CONTINUOUS PRN
Status: DISCONTINUED | OUTPATIENT
Start: 2021-08-25 | End: 2021-08-26

## 2021-08-25 RX ORDER — METHYLERGONOVINE MALEATE 0.2 MG/ML
200 INJECTION INTRAVENOUS
Status: DISCONTINUED | OUTPATIENT
Start: 2021-08-25 | End: 2021-08-26 | Stop reason: HOSPADM

## 2021-08-25 RX ORDER — CARBOPROST TROMETHAMINE 250 UG/ML
250 INJECTION, SOLUTION INTRAMUSCULAR
Status: DISCONTINUED | OUTPATIENT
Start: 2021-08-25 | End: 2021-08-26 | Stop reason: HOSPADM

## 2021-08-25 RX ORDER — LIDOCAINE 40 MG/G
CREAM TOPICAL
Status: DISCONTINUED | OUTPATIENT
Start: 2021-08-25 | End: 2021-08-26 | Stop reason: HOSPADM

## 2021-08-25 RX ADMIN — Medication 25 MCG: at 17:45

## 2021-08-25 RX ADMIN — Medication 25 MCG: at 22:42

## 2021-08-25 ASSESSMENT — ANXIETY QUESTIONNAIRES: GAD7 TOTAL SCORE: 0

## 2021-08-25 ASSESSMENT — MIFFLIN-ST. JEOR: SCORE: 1509.26

## 2021-08-25 NOTE — PROGRESS NOTES
"Transfer of Care  SUBJECTIVE  26 year old woman presents to clinic for transfer of OB care appointment.    Patient's last menstrual period was 2020 (exact date).  at 37w3d by Estimated Date of Delivery: Sep 12, 2021 based on LMP.     - Feels well overall.   - Pt was receiving prenatal care from [facility] and [facility].  Initiated prenatal care at [facility] unkown weeks, has had 5 visit total visits at [facility].      - Reason for transfer to [facility] care elevated blood pressure  - paper prenatal records received via fax, reviewed MARKO signed for [facility] records  - After review of prenatal records, additional routine orders are recommended including consider Hep C and Hep B antibody if not collected at [facility]  -Level II Ultsound report not available per notes in [facility] chart WNL 21 posterior placenta  - Pre pregnancy BMI 25.   Pre Pregnancy Weight 140.  Height 62\".       OTHER CONCERNS: HTN      - Current Medications    Current Outpatient Medications   Medication Sig Dispense Refill     Cyanocobalamin (VITAMIN B 12) 100 MCG LOZG        ferrous sulfate (FEROSUL) 325 (65 Fe) MG tablet Take 325 mg by mouth daily (with breakfast)       fish oil-omega-3 fatty acids 1000 MG capsule Take 2 g by mouth daily       lactobacillus rhamnosus, GG, (CULTURELL) capsule Take 1 capsule by mouth 2 times daily       Prenatal Vit-Fe Fumarate-FA (PRENATAL MULTIVITAMIN W/IRON) 27-0.8 MG tablet Take 1 tablet by mouth daily           - Co-morbids    Past Medical History:   Diagnosis Date     Asthma 2021     CARDIOVASCULAR SCREENING; LDL GOAL LESS THAN 160 2013     Depression with anxiety      Depressive disorder 2021     Panic attacks      Pyelonephritis      Strawberry nevus     above (R) ear       PERSONAL/SOCIAL HISTORY  Partner is involved,  Porter    lives with their spouse.  Employment: Full time. Her job involves sedentary activity .  History of anxiety or depression  Yes- no medications at this " time    PSYCHIATRIC:  Denies depression or anxiety.  Has History of anxiety  PHQ-9 score:    PHQ-9 SCORE 11/6/2015   PHQ-9 Total Score -   PHQ-9 Total Score 0     REI-7 SCORE 1/20/2015 11/6/2015 8/24/2021   Total Score 2 - -   Total Score - - 0 (minimal anxiety)   Total Score - 1 0         Past History:  Her past medical history   Past Medical History:   Diagnosis Date     Asthma 6/22/2021     CARDIOVASCULAR SCREENING; LDL GOAL LESS THAN 160 8/23/2013     Depression with anxiety      Depressive disorder 6/22/2021     Panic attacks      Pyelonephritis      Strawberry nevus     above (R) ear   .   This is her first pregnancy  Since her last LMP she denies use of alcohol, tobacco and street drugs.  HISTORY:  Family History   Problem Relation Age of Onset     Hypertension Paternal Grandmother      Psychotic Disorder Sister         anxiety     Asthma Sister         Exercise induced asthma     Other Cancer Maternal Grandmother         great grandma cervical cancer     Social History     Socioeconomic History     Marital status:      Spouse name: None     Number of children: None     Years of education: None     Highest education level: None   Occupational History     None   Tobacco Use     Smoking status: Never Smoker     Smokeless tobacco: Never Used   Substance and Sexual Activity     Alcohol use: Not Currently     Drug use: No     Sexual activity: Yes     Partners: Male     Birth control/protection: Pull-out method, Pill   Other Topics Concern     Parent/sibling w/ CABG, MI or angioplasty before 65F 55M? No   Social History Narrative     None     Social Determinants of Health     Financial Resource Strain:      Difficulty of Paying Living Expenses:    Food Insecurity:      Worried About Running Out of Food in the Last Year:      Ran Out of Food in the Last Year:    Transportation Needs:      Lack of Transportation (Medical):      Lack of Transportation (Non-Medical):    Physical Activity:      Days of Exercise  "per Week:      Minutes of Exercise per Session:    Stress:      Feeling of Stress :    Social Connections:      Frequency of Communication with Friends and Family:      Frequency of Social Gatherings with Friends and Family:      Attends Taoism Services:      Active Member of Clubs or Organizations:      Attends Club or Organization Meetings:      Marital Status:    Intimate Partner Violence:      Fear of Current or Ex-Partner:      Emotionally Abused:      Physically Abused:      Sexually Abused:      Current Outpatient Medications   Medication Sig     Cyanocobalamin (VITAMIN B 12) 100 MCG LOZG      ferrous sulfate (FEROSUL) 325 (65 Fe) MG tablet Take 325 mg by mouth daily (with breakfast)     fish oil-omega-3 fatty acids 1000 MG capsule Take 2 g by mouth daily     lactobacillus rhamnosus, GG, (CULTURELL) capsule Take 1 capsule by mouth 2 times daily     Prenatal Vit-Fe Fumarate-FA (PRENATAL MULTIVITAMIN W/IRON) 27-0.8 MG tablet Take 1 tablet by mouth daily     No current facility-administered medications for this visit.     Allergies   Allergen Reactions     Keflex [Cephalexin]        ============================================  MEDICAL HISTORY  Past Medical History:   Diagnosis Date     Asthma 2021     CARDIOVASCULAR SCREENING; LDL GOAL LESS THAN 160 2013     Depression with anxiety      Depressive disorder 2021     Panic attacks      Pyelonephritis      Strawberry nevus     above (R) ear     Past Surgical History:   Procedure Laterality Date     NO HISTORY OF SURGERY         OB History    Para Term  AB Living   1 0 0 0 0 0   SAB TAB Ectopic Multiple Live Births   0 0 0 0 0      # Outcome Date GA Lbr Clinton/2nd Weight Sex Delivery Anes PTL Lv   1 Current                    GYN History- no abnormal pap smears          ROS: 10 point ROS neg other than the symptoms noted above in the HPI.    Objective  Ht 1.575 m (5' 2\")   Wt 82.1 kg (181 lb)   LMP 2020 (Exact Date)   BMI 33.11 " kg/m         Lab Results   Component Value Date    PAP NIL 2018        Assessment/Plan  26 year old , 37w3d weeks of pregnancy with ROYER of Sep 12, 2021 by LMP  Intrauterine pregnancy 37w3d size  consistent with dates  BPP  Cephalic presentation.  Based on blood pressure criteria, dx with TN.  Reviewed findings with patient and .  Recommend induction of labor, patient and  agree with plan.  Charge RN and on-call CNM notified.    JOSELINE Freitas CNM  Answers for HPI/ROS submitted by the patient on 2021  REI 7 TOTAL SCORE: 0

## 2021-08-25 NOTE — H&P
"ADMIT NOTE  =================  37w3d    Brielle Pearson is a 26 year old female with an Patient's last menstrual period was 2020 (exact date). and Estimated Date of Delivery: Sep 12, 2021 [facility] 2021 at 4:18 PM for induction of labor.  Indication: maternal indication GHTN met criteria today in clinic   Pt was seen on   for initial Transfer preeclampsia evaluation in triage BPs all WNL and labs all WNL .     HPI  ================  Pt presented to [facility] had 1 elevated /91 so IOL for GHTN advised    Denies fever, cough, SOB or chest pain. Denies having contact with anyone who is Covid-19 positive. Agreeable to Covid-19 testing  Contractions- irreg  Fetal movement- active  ROM- no.  Vaginal bleeding- none  GBS- positive- not treated until active labor  FOB- is involved,   Other labor support- Trista Jericho     Weight gain- 181 - 140 lbs, Total weight gain- 39 lbs  Height- 5'2\"  BMI- 25  First prenatal visit at [facility]  Unavailable  X 2 visits  MICHELET to [place] x 5 visits in EHR  Weeks MICHELET to [facility] x 1 visit at 37w3  , Total visits-8    PROBLEM LIST  =================  Patient Active Problem List    Diagnosis Date Noted     HRP (high risk pregnancy), third trimester 2021     Priority: [facility] CNM pt  Partner's name: Porter  MICHELET at 37.3w from [place].  Labs: Blood type A pos.  Negative antibody screen.  Hgb 13.2, Plt's 230, VDRL Neg, Gc/CT negative.  Rubella Immune, Abnormal 1hr 145 normal 2hr GTT 83,156,144.  GBS positive  [ ] NOB folder  [ ] Dating  [ ] First tri screen ordered; declined  [ ] QS/AFP ordered declined  [ ] Fetal anatomy US ordered  [ ] Rubella immune  [ ] Hep B immune/nonimmune  [ ] Pap  [ ] Started ASA   [ ] NO YES plan utox in labor   [ ] COVID vaccine completed  _____________________________________  [ ] EOB folder  [ ] PP Contraception plan: If tubal,consent date:  [ ] Labor plans:  [ ] :  [ ] Infant feeding plan  [ ] FLU shot  [ ] TDAP " given  [ ] Rhogam if needed, date:  [ ] TOLAC consent done  [ ] Waterbirth declines, consent done  [ ] GCT, passed  ________________________________________  [ ] OTC PP meds sent  [ ] Planning CS-ERAS pkt       GBS (group B Streptococcus carrier), +RV culture, currently pregnant 2021     Priority: Medium     Labor and delivery, indication for care 2021     Priority: Medium     Elevated BP without diagnosis of hypertension 2021     Priority: Medium     Triage on 21:  Systolic range 124-138 Diastolic 69-75 BP at [place] 21 37w 146/84 (at [facility]) 126/99 and 130/89  Pre-eclampsia labs WNL  Blood pressure 138/70 at 32.5w  Blood pressure 21:   129/87, 139/88, 143/87 avg 137/87       Anxiety 2021     Priority: Medium     Asthma 2021     Priority: Medium     Depressive disorder 2021     Priority: Medium     Drug rash 2014     Priority: Medium     Posttraumatic stress disorder, provisional 2012     Priority: Medium       HISTORIES  ============  Allergies   Allergen Reactions     Keflex [Cephalexin]      Past Medical History:   Diagnosis Date     Asthma 2021     CARDIOVASCULAR SCREENING; LDL GOAL LESS THAN 160 2013     Depression with anxiety      Depressive disorder 2021     Panic attacks      Pyelonephritis      Strawberry nevus     above (R) ear     Past Surgical History:   Procedure Laterality Date     NO HISTORY OF SURGERY     .  Family History   Problem Relation Age of Onset     Hypertension Paternal Grandmother      Psychotic Disorder Sister         anxiety     Asthma Sister         Exercise induced asthma     Other Cancer Maternal Grandmother         great grandma cervical cancer     Social History     Tobacco Use     Smoking status: Never Smoker     Smokeless tobacco: Never Used   Substance Use Topics     Alcohol use: Not Currently     OB History    Para Term  AB Living   1 0 0 0 0 0   SAB TAB Ectopic Multiple Live Births   0 0 0  0 0      # Outcome Date GA Lbr Clinton/2nd Weight Sex Delivery Anes PTL Lv   1 Current                 LABS:   ===========  Prenatal Labs:  Rhogam not indicated   Lab Results   Component Value Date    HGB 13.6 08/22/2021     Rubella immune    GBS POS  Other labs:  COVID-19 PCR Results    COVID-19 PCR Results   No data to display.         COVID-19 Antibody Results, Testing for Immunity    COVID-19 Antibody Results, Testing for Immunity   No data to display.            No results found for this or any previous visit (from the past 24 hour(s)).    ROS  =========  Pt denies significant respiratory, cardiovacular, GI, or muscular/skeletalcomplaints.    See RN data base ROS.       PHYSICAL EXAM:  ===============  LMP 12/06/2020 (Exact Date)   General appearance: comfortable  GENERAL APPEARANCE: healthy, alert and no distress  RESP: lungs clear to auscultation - no rales, rhonchi or wheezes  CV: regular rates and rhythm, normal S1 S2, no S3 or S4 and no murmur,and no varicosities  ABDOMEN:  soft, nontender, no epigastric pain  SKIN: no suspicious lesions or rashes  NEURO: Denies headache, blurred vision, other vision changes  PSYCH: mentation appears normal. and affect normal/bright  Legs: Reflexes normal bilaterally     Abdomen: gravid, vertex fetus per Leopold's, non-tender between contractions.   Cephalic presentation confirmed by BSUS in clinic  EFW-  7 1/4 lbs.   CONTRACTIONS: irreg  FETAL HEART TONES: continuous EFM- baseline 140 with moderate variability and positive accelerations. No decelerations.  PELVIC EXAM: FT/ 60%/ Posterior/ average/ -2   TORRES SCORE: 4  BLOODY SHOW: no   ROM:no    # Pain Assessment:  Current Pain Score 8/22/2021   Patient currently in pain? armando kilpartick pain level was assessed and she currently denies pain.        ASSESSMENT:  ==============  25 yo G1 IUP @ 37w3d admitted for induction of labor.  Indication: gestational hypertension   NST REACTIVE  Fetal Heart Tones - category one  GBS-  positive- not treated until active labor  Covid- pending    Patient Active Problem List   Diagnosis     Posttraumatic stress disorder, provisional     Drug rash     Elevated BP without diagnosis of hypertension     Anxiety     Asthma     Depressive disorder     HRP (high risk pregnancy), third trimester     GBS (group B Streptococcus carrier), +RV culture, currently pregnant     Labor and delivery, indication for care       PLAN:  ===========  Admit - see IP orders  pain medication options of nitrous oxide, fentanyl IV and epidural anesthesia reviewed with pt.   cervical ripening with vaginal Misoprostol risks and benefits reviewed with pt. Agreeable to plan.  MD consultant on call Dr Azar / available prn  Ambulation, hydration, position changes, birthing ball and tub options to facilitate labor reviewed with pt .  Anticipate   JOSELINE JacoboM

## 2021-08-25 NOTE — LETTER
"2021       RE: Brielle Pearson  [address] [address]     Dear Colleague,    Thank you for referring your patient, Brielle Pearson, to the [facility]. Please see a copy of my visit note below.    Transfer of Care  SUBJECTIVE  26 year old woman presents to clinic for transfer of OB care appointment.    Patient's last menstrual period was 2020 (exact date).  at 37w3d by Estimated Date of Delivery: Sep 12, 2021 based on LMP.     - Feels well overall.   - Pt was receiving prenatal care from [facility] and [facility].  Initiated prenatal care at [facility] unkown weeks, has had 5 visit total visits at [facility].      - Reason for transfer to [facility] care elevated blood pressure  - paper prenatal records received via fax, reviewed MARKO signed for [facility] records  - After review of prenatal records, additional routine orders are recommended including consider Hep C and Hep B antibody if not collected at [facility]  -Level II Ultsound report not available per notes in [facility] chart WNL 21 posterior placenta  - Pre pregnancy BMI 25.   Pre Pregnancy Weight 140.  Height 62\".       OTHER CONCERNS: HTN      - Current Medications    Current Outpatient Medications   Medication Sig Dispense Refill     Cyanocobalamin (VITAMIN B 12) 100 MCG LOZG        ferrous sulfate (FEROSUL) 325 (65 Fe) MG tablet Take 325 mg by mouth daily (with breakfast)       fish oil-omega-3 fatty acids 1000 MG capsule Take 2 g by mouth daily       lactobacillus rhamnosus, GG, (CULTURELL) capsule Take 1 capsule by mouth 2 times daily       Prenatal Vit-Fe Fumarate-FA (PRENATAL MULTIVITAMIN W/IRON) 27-0.8 MG tablet Take 1 tablet by mouth daily           - Co-morbids    Past Medical History:   Diagnosis Date     Asthma 2021     CARDIOVASCULAR SCREENING; LDL GOAL LESS THAN 160 2013     Depression with anxiety      Depressive disorder " 6/22/2021     Panic attacks      Pyelonephritis      Strawberry nevus     above (R) ear       PERSONAL/SOCIAL HISTORY  Partner is involved,  Porter    lives with their spouse.  Employment: Full time. Her job involves sedentary activity .  History of anxiety or depression  Yes- no medications at this time    PSYCHIATRIC:  Denies depression or anxiety.  Has History of anxiety  PHQ-9 score:    PHQ-9 SCORE 11/6/2015   PHQ-9 Total Score -   PHQ-9 Total Score 0     REI-7 SCORE 1/20/2015 11/6/2015 8/24/2021   Total Score 2 - -   Total Score - - 0 (minimal anxiety)   Total Score - 1 0         Past History:  Her past medical history   Past Medical History:   Diagnosis Date     Asthma 6/22/2021     CARDIOVASCULAR SCREENING; LDL GOAL LESS THAN 160 8/23/2013     Depression with anxiety      Depressive disorder 6/22/2021     Panic attacks      Pyelonephritis      Strawberry nevus     above (R) ear   .   This is her first pregnancy  Since her last LMP she denies use of alcohol, tobacco and street drugs.  HISTORY:  Family History   Problem Relation Age of Onset     Hypertension Paternal Grandmother      Psychotic Disorder Sister         anxiety     Asthma Sister         Exercise induced asthma     Other Cancer Maternal Grandmother         great grandma cervical cancer     Social History     Socioeconomic History     Marital status:      Spouse name: None     Number of children: None     Years of education: None     Highest education level: None   Occupational History     None   Tobacco Use     Smoking status: Never Smoker     Smokeless tobacco: Never Used   Substance and Sexual Activity     Alcohol use: Not Currently     Drug use: No     Sexual activity: Yes     Partners: Male     Birth control/protection: Pull-out method, Pill   Other Topics Concern     Parent/sibling w/ CABG, MI or angioplasty before 65F 55M? No   Social History Narrative     None     Social Determinants of Health     Financial Resource Strain:       Difficulty of Paying Living Expenses:    Food Insecurity:      Worried About Running Out of Food in the Last Year:      Ran Out of Food in the Last Year:    Transportation Needs:      Lack of Transportation (Medical):      Lack of Transportation (Non-Medical):    Physical Activity:      Days of Exercise per Week:      Minutes of Exercise per Session:    Stress:      Feeling of Stress :    Social Connections:      Frequency of Communication with Friends and Family:      Frequency of Social Gatherings with Friends and Family:      Attends Worship Services:      Active Member of Clubs or Organizations:      Attends Club or Organization Meetings:      Marital Status:    Intimate Partner Violence:      Fear of Current or Ex-Partner:      Emotionally Abused:      Physically Abused:      Sexually Abused:      Current Outpatient Medications   Medication Sig     Cyanocobalamin (VITAMIN B 12) 100 MCG LOZG      ferrous sulfate (FEROSUL) 325 (65 Fe) MG tablet Take 325 mg by mouth daily (with breakfast)     fish oil-omega-3 fatty acids 1000 MG capsule Take 2 g by mouth daily     lactobacillus rhamnosus, GG, (CULTURELL) capsule Take 1 capsule by mouth 2 times daily     Prenatal Vit-Fe Fumarate-FA (PRENATAL MULTIVITAMIN W/IRON) 27-0.8 MG tablet Take 1 tablet by mouth daily     No current facility-administered medications for this visit.     Allergies   Allergen Reactions     Keflex [Cephalexin]        ============================================  MEDICAL HISTORY  Past Medical History:   Diagnosis Date     Asthma 2021     CARDIOVASCULAR SCREENING; LDL GOAL LESS THAN 160 2013     Depression with anxiety      Depressive disorder 2021     Panic attacks      Pyelonephritis      Strawberry nevus     above (R) ear     Past Surgical History:   Procedure Laterality Date     NO HISTORY OF SURGERY         OB History    Para Term  AB Living   1 0 0 0 0 0   SAB TAB Ectopic Multiple Live Births   0 0 0 0 0      #  "Outcome Date GA Lbr Clinton/2nd Weight Sex Delivery Anes PTL Lv   1 Current                    GYN History- no abnormal pap smears          ROS: 10 point ROS neg other than the symptoms noted above in the HPI.    Objective  Ht 1.575 m (5' 2\")   Wt 82.1 kg (181 lb)   LMP 2020 (Exact Date)   BMI 33.11 kg/m         Lab Results   Component Value Date    PAP NIL 2018        Assessment/Plan  26 year old , 37w3d weeks of pregnancy with ROYER of Sep 12, 2021 by LMP  Intrauterine pregnancy 37w3d size  consistent with dates  BPP  Cephalic presentation.  Based on blood pressure criteria, dx with TN.  Reviewed findings with patient and .  Recommend induction of labor, patient and  agree with plan.  Charge RN and on-call CNM notified.    Gay Anthony, JOSELINE CNM  Answers for HPI/ROS submitted by the patient on 2021  REI 7 TOTAL SCORE: 0      "

## 2021-08-26 ENCOUNTER — ANESTHESIA (OUTPATIENT)
Dept: OBGYN | Facility: CLINIC | Age: 26
End: 2021-08-26
Payer: COMMERCIAL

## 2021-08-26 ENCOUNTER — ANESTHESIA EVENT (OUTPATIENT)
Dept: OBGYN | Facility: CLINIC | Age: 26
End: 2021-08-26
Payer: COMMERCIAL

## 2021-08-26 ENCOUNTER — APPOINTMENT (OUTPATIENT)
Dept: GENERAL RADIOLOGY | Facility: CLINIC | Age: 26
End: 2021-08-26
Attending: OBSTETRICS & GYNECOLOGY
Payer: COMMERCIAL

## 2021-08-26 LAB — T PALLIDUM AB SER QL: NONREACTIVE

## 2021-08-26 PROCEDURE — 250N000011 HC RX IP 250 OP 636: Performed by: STUDENT IN AN ORGANIZED HEALTH CARE EDUCATION/TRAINING PROGRAM

## 2021-08-26 PROCEDURE — 258N000003 HC RX IP 258 OP 636: Performed by: NURSE ANESTHETIST, CERTIFIED REGISTERED

## 2021-08-26 PROCEDURE — 250N000009 HC RX 250: Performed by: ADVANCED PRACTICE MIDWIFE

## 2021-08-26 PROCEDURE — 74018 RADEX ABDOMEN 1 VIEW: CPT | Mod: 26

## 2021-08-26 PROCEDURE — 250N000011 HC RX IP 250 OP 636: Performed by: MIDWIFE

## 2021-08-26 PROCEDURE — 250N000013 HC RX MED GY IP 250 OP 250 PS 637: Performed by: ADVANCED PRACTICE MIDWIFE

## 2021-08-26 PROCEDURE — 999N000063 XR ABDOMEN PORT 1 VIEWS

## 2021-08-26 PROCEDURE — 250N000009 HC RX 250: Performed by: STUDENT IN AN ORGANIZED HEALTH CARE EDUCATION/TRAINING PROGRAM

## 2021-08-26 PROCEDURE — 258N000003 HC RX IP 258 OP 636: Performed by: ADVANCED PRACTICE MIDWIFE

## 2021-08-26 PROCEDURE — 120N000002 HC R&B MED SURG/OB UMMC

## 2021-08-26 PROCEDURE — 3E033VJ INTRODUCTION OF OTHER HORMONE INTO PERIPHERAL VEIN, PERCUTANEOUS APPROACH: ICD-10-PCS | Performed by: ADVANCED PRACTICE MIDWIFE

## 2021-08-26 PROCEDURE — 250N000011 HC RX IP 250 OP 636: Performed by: ANESTHESIOLOGY

## 2021-08-26 PROCEDURE — 59514 CESAREAN DELIVERY ONLY: CPT | Mod: GC | Performed by: OBSTETRICS & GYNECOLOGY

## 2021-08-26 PROCEDURE — 360N000076 HC SURGERY LEVEL 3, PER MIN: Performed by: OBSTETRICS & GYNECOLOGY

## 2021-08-26 PROCEDURE — 250N000011 HC RX IP 250 OP 636

## 2021-08-26 PROCEDURE — 710N000010 HC RECOVERY PHASE 1, LEVEL 2, PER MIN: Performed by: OBSTETRICS & GYNECOLOGY

## 2021-08-26 PROCEDURE — 250N000013 HC RX MED GY IP 250 OP 250 PS 637: Performed by: STUDENT IN AN ORGANIZED HEALTH CARE EDUCATION/TRAINING PROGRAM

## 2021-08-26 PROCEDURE — 250N000009 HC RX 250: Performed by: ANESTHESIOLOGY

## 2021-08-26 PROCEDURE — 271N000001 HC OR GENERAL SUPPLY NON-STERILE: Performed by: OBSTETRICS & GYNECOLOGY

## 2021-08-26 PROCEDURE — 370N000017 HC ANESTHESIA TECHNICAL FEE, PER MIN: Performed by: OBSTETRICS & GYNECOLOGY

## 2021-08-26 PROCEDURE — 250N000011 HC RX IP 250 OP 636: Performed by: NURSE ANESTHETIST, CERTIFIED REGISTERED

## 2021-08-26 PROCEDURE — 272N000001 HC OR GENERAL SUPPLY STERILE: Performed by: OBSTETRICS & GYNECOLOGY

## 2021-08-26 PROCEDURE — 250N000009 HC RX 250: Performed by: NURSE ANESTHETIST, CERTIFIED REGISTERED

## 2021-08-26 PROCEDURE — C9290 INJ, BUPIVACAINE LIPOSOME: HCPCS | Performed by: ANESTHESIOLOGY

## 2021-08-26 PROCEDURE — 258N000003 HC RX IP 258 OP 636: Performed by: STUDENT IN AN ORGANIZED HEALTH CARE EDUCATION/TRAINING PROGRAM

## 2021-08-26 RX ORDER — MISOPROSTOL 200 UG/1
400 TABLET ORAL
Status: DISCONTINUED | OUTPATIENT
Start: 2021-08-26 | End: 2021-08-26 | Stop reason: HOSPADM

## 2021-08-26 RX ORDER — MISOPROSTOL 200 UG/1
TABLET ORAL
Status: DISCONTINUED
Start: 2021-08-26 | End: 2021-08-26 | Stop reason: HOSPADM

## 2021-08-26 RX ORDER — LIDOCAINE 40 MG/G
CREAM TOPICAL
Status: DISCONTINUED | OUTPATIENT
Start: 2021-08-26 | End: 2021-08-28 | Stop reason: HOSPADM

## 2021-08-26 RX ORDER — METOCLOPRAMIDE 10 MG/1
10 TABLET ORAL EVERY 6 HOURS PRN
Status: DISCONTINUED | OUTPATIENT
Start: 2021-08-26 | End: 2021-08-28 | Stop reason: HOSPADM

## 2021-08-26 RX ORDER — MISOPROSTOL 200 UG/1
400 TABLET ORAL
Status: DISCONTINUED | OUTPATIENT
Start: 2021-08-26 | End: 2021-08-28 | Stop reason: HOSPADM

## 2021-08-26 RX ORDER — KETOROLAC TROMETHAMINE 30 MG/ML
30 INJECTION, SOLUTION INTRAMUSCULAR; INTRAVENOUS EVERY 6 HOURS
Status: COMPLETED | OUTPATIENT
Start: 2021-08-27 | End: 2021-08-27

## 2021-08-26 RX ORDER — PROCHLORPERAZINE MALEATE 10 MG
10 TABLET ORAL EVERY 6 HOURS PRN
Status: DISCONTINUED | OUTPATIENT
Start: 2021-08-26 | End: 2021-08-28 | Stop reason: HOSPADM

## 2021-08-26 RX ORDER — FENTANYL CITRATE-0.9 % NACL/PF 10 MCG/ML
100 PLASTIC BAG, INJECTION (ML) INTRAVENOUS EVERY 5 MIN PRN
Status: DISCONTINUED | OUTPATIENT
Start: 2021-08-26 | End: 2021-08-26

## 2021-08-26 RX ORDER — ONDANSETRON 2 MG/ML
INJECTION INTRAMUSCULAR; INTRAVENOUS PRN
Status: DISCONTINUED | OUTPATIENT
Start: 2021-08-26 | End: 2021-08-26

## 2021-08-26 RX ORDER — FENTANYL/BUPIVACAINE/NS/PF 2-1250MCG
PLASTIC BAG, INJECTION (ML) INJECTION
Status: COMPLETED
Start: 2021-08-26 | End: 2021-08-26

## 2021-08-26 RX ORDER — OXYCODONE HYDROCHLORIDE 5 MG/1
5 TABLET ORAL EVERY 4 HOURS PRN
Status: DISCONTINUED | OUTPATIENT
Start: 2021-08-26 | End: 2021-08-28 | Stop reason: HOSPADM

## 2021-08-26 RX ORDER — ONDANSETRON 2 MG/ML
4 INJECTION INTRAMUSCULAR; INTRAVENOUS EVERY 6 HOURS PRN
Status: DISCONTINUED | OUTPATIENT
Start: 2021-08-26 | End: 2021-08-26

## 2021-08-26 RX ORDER — OXYTOCIN 10 [USP'U]/ML
10 INJECTION, SOLUTION INTRAMUSCULAR; INTRAVENOUS
Status: DISCONTINUED | OUTPATIENT
Start: 2021-08-26 | End: 2021-08-26 | Stop reason: HOSPADM

## 2021-08-26 RX ORDER — SIMETHICONE 80 MG
80 TABLET,CHEWABLE ORAL 4 TIMES DAILY PRN
Status: DISCONTINUED | OUTPATIENT
Start: 2021-08-26 | End: 2021-08-28 | Stop reason: HOSPADM

## 2021-08-26 RX ORDER — LIDOCAINE HYDROCHLORIDE 20 MG/ML
INJECTION, SOLUTION INFILTRATION; PERINEURAL PRN
Status: DISCONTINUED | OUTPATIENT
Start: 2021-08-26 | End: 2021-08-26

## 2021-08-26 RX ORDER — METHYLERGONOVINE MALEATE 0.2 MG/ML
200 INJECTION INTRAVENOUS
Status: DISCONTINUED | OUTPATIENT
Start: 2021-08-26 | End: 2021-08-26 | Stop reason: HOSPADM

## 2021-08-26 RX ORDER — METHYLERGONOVINE MALEATE 0.2 MG/ML
200 INJECTION INTRAVENOUS
Status: DISCONTINUED | OUTPATIENT
Start: 2021-08-26 | End: 2021-08-28 | Stop reason: HOSPADM

## 2021-08-26 RX ORDER — CEFAZOLIN SODIUM 2 G/100ML
INJECTION, SOLUTION INTRAVENOUS PRN
Status: DISCONTINUED | OUTPATIENT
Start: 2021-08-26 | End: 2021-08-26

## 2021-08-26 RX ORDER — OXYTOCIN/0.9 % SODIUM CHLORIDE 30/500 ML
340 PLASTIC BAG, INJECTION (ML) INTRAVENOUS CONTINUOUS PRN
Status: DISCONTINUED | OUTPATIENT
Start: 2021-08-26 | End: 2021-08-26 | Stop reason: HOSPADM

## 2021-08-26 RX ORDER — TRANEXAMIC ACID 10 MG/ML
1 INJECTION, SOLUTION INTRAVENOUS EVERY 30 MIN PRN
Status: DISCONTINUED | OUTPATIENT
Start: 2021-08-26 | End: 2021-08-26 | Stop reason: HOSPADM

## 2021-08-26 RX ORDER — OXYTOCIN/0.9 % SODIUM CHLORIDE 30/500 ML
340 PLASTIC BAG, INJECTION (ML) INTRAVENOUS CONTINUOUS PRN
Status: DISCONTINUED | OUTPATIENT
Start: 2021-08-26 | End: 2021-08-28 | Stop reason: HOSPADM

## 2021-08-26 RX ORDER — NALBUPHINE HYDROCHLORIDE 10 MG/ML
2.5-5 INJECTION, SOLUTION INTRAMUSCULAR; INTRAVENOUS; SUBCUTANEOUS EVERY 6 HOURS PRN
Status: DISCONTINUED | OUTPATIENT
Start: 2021-08-26 | End: 2021-08-26

## 2021-08-26 RX ORDER — LIDOCAINE HYDROCHLORIDE 10 MG/ML
INJECTION, SOLUTION EPIDURAL; INFILTRATION; INTRACAUDAL; PERINEURAL
Status: DISCONTINUED
Start: 2021-08-26 | End: 2021-08-26 | Stop reason: HOSPADM

## 2021-08-26 RX ORDER — MISOPROSTOL 200 UG/1
800 TABLET ORAL
Status: DISCONTINUED | OUTPATIENT
Start: 2021-08-26 | End: 2021-08-28 | Stop reason: HOSPADM

## 2021-08-26 RX ORDER — NALOXONE HYDROCHLORIDE 0.4 MG/ML
0.2 INJECTION, SOLUTION INTRAMUSCULAR; INTRAVENOUS; SUBCUTANEOUS
Status: DISCONTINUED | OUTPATIENT
Start: 2021-08-26 | End: 2021-08-28 | Stop reason: HOSPADM

## 2021-08-26 RX ORDER — OXYTOCIN/0.9 % SODIUM CHLORIDE 30/500 ML
PLASTIC BAG, INJECTION (ML) INTRAVENOUS
Status: DISCONTINUED
Start: 2021-08-26 | End: 2021-08-26 | Stop reason: HOSPADM

## 2021-08-26 RX ORDER — LACTOBACILLUS RHAMNOSUS GG 10B CELL
1 CAPSULE ORAL 2 TIMES DAILY
Status: DISCONTINUED | OUTPATIENT
Start: 2021-08-26 | End: 2021-08-26

## 2021-08-26 RX ORDER — OXYTOCIN/0.9 % SODIUM CHLORIDE 30/500 ML
100-340 PLASTIC BAG, INJECTION (ML) INTRAVENOUS CONTINUOUS PRN
Status: DISCONTINUED | OUTPATIENT
Start: 2021-08-26 | End: 2021-08-26 | Stop reason: HOSPADM

## 2021-08-26 RX ORDER — OXYTOCIN/0.9 % SODIUM CHLORIDE 30/500 ML
PLASTIC BAG, INJECTION (ML) INTRAVENOUS CONTINUOUS PRN
Status: DISCONTINUED | OUTPATIENT
Start: 2021-08-26 | End: 2021-08-26

## 2021-08-26 RX ORDER — NALOXONE HYDROCHLORIDE 0.4 MG/ML
0.4 INJECTION, SOLUTION INTRAMUSCULAR; INTRAVENOUS; SUBCUTANEOUS
Status: DISCONTINUED | OUTPATIENT
Start: 2021-08-26 | End: 2021-08-28 | Stop reason: HOSPADM

## 2021-08-26 RX ORDER — OXYTOCIN 10 [USP'U]/ML
10 INJECTION, SOLUTION INTRAMUSCULAR; INTRAVENOUS
Status: DISCONTINUED | OUTPATIENT
Start: 2021-08-26 | End: 2021-08-28 | Stop reason: HOSPADM

## 2021-08-26 RX ORDER — MODIFIED LANOLIN
OINTMENT (GRAM) TOPICAL
Status: DISCONTINUED | OUTPATIENT
Start: 2021-08-26 | End: 2021-08-28 | Stop reason: HOSPADM

## 2021-08-26 RX ORDER — MISOPROSTOL 200 UG/1
800 TABLET ORAL
Status: DISCONTINUED | OUTPATIENT
Start: 2021-08-26 | End: 2021-08-26 | Stop reason: HOSPADM

## 2021-08-26 RX ORDER — ACETAMINOPHEN 325 MG/1
975 TABLET ORAL ONCE
Status: DISCONTINUED | OUTPATIENT
Start: 2021-08-26 | End: 2021-08-26 | Stop reason: HOSPADM

## 2021-08-26 RX ORDER — SODIUM CHLORIDE, SODIUM LACTATE, POTASSIUM CHLORIDE, CALCIUM CHLORIDE 600; 310; 30; 20 MG/100ML; MG/100ML; MG/100ML; MG/100ML
INJECTION, SOLUTION INTRAVENOUS CONTINUOUS
Status: DISCONTINUED | OUTPATIENT
Start: 2021-08-26 | End: 2021-08-26

## 2021-08-26 RX ORDER — ONDANSETRON 2 MG/ML
4 INJECTION INTRAMUSCULAR; INTRAVENOUS EVERY 6 HOURS PRN
Status: DISCONTINUED | OUTPATIENT
Start: 2021-08-26 | End: 2021-08-28 | Stop reason: HOSPADM

## 2021-08-26 RX ORDER — BUPIVACAINE HYDROCHLORIDE 2.5 MG/ML
INJECTION, SOLUTION EPIDURAL; INFILTRATION; INTRACAUDAL PRN
Status: DISCONTINUED | OUTPATIENT
Start: 2021-08-26 | End: 2021-08-26

## 2021-08-26 RX ORDER — PROCHLORPERAZINE 25 MG
25 SUPPOSITORY, RECTAL RECTAL EVERY 12 HOURS PRN
Status: DISCONTINUED | OUTPATIENT
Start: 2021-08-26 | End: 2021-08-28 | Stop reason: HOSPADM

## 2021-08-26 RX ORDER — DEXTROSE, SODIUM CHLORIDE, SODIUM LACTATE, POTASSIUM CHLORIDE, AND CALCIUM CHLORIDE 5; .6; .31; .03; .02 G/100ML; G/100ML; G/100ML; G/100ML; G/100ML
INJECTION, SOLUTION INTRAVENOUS CONTINUOUS
Status: DISCONTINUED | OUTPATIENT
Start: 2021-08-26 | End: 2021-08-28 | Stop reason: HOSPADM

## 2021-08-26 RX ORDER — FENTANYL CITRATE-0.9 % NACL/PF 10 MCG/ML
PLASTIC BAG, INJECTION (ML) INTRAVENOUS CONTINUOUS PRN
Status: DISCONTINUED | OUTPATIENT
Start: 2021-08-26 | End: 2021-08-26

## 2021-08-26 RX ORDER — CARBOPROST TROMETHAMINE 250 UG/ML
250 INJECTION, SOLUTION INTRAMUSCULAR
Status: DISCONTINUED | OUTPATIENT
Start: 2021-08-26 | End: 2021-08-28 | Stop reason: HOSPADM

## 2021-08-26 RX ORDER — LIDOCAINE 40 MG/G
CREAM TOPICAL
Status: DISCONTINUED | OUTPATIENT
Start: 2021-08-26 | End: 2021-08-26 | Stop reason: HOSPADM

## 2021-08-26 RX ORDER — ONDANSETRON 4 MG/1
4 TABLET, ORALLY DISINTEGRATING ORAL EVERY 6 HOURS PRN
Status: DISCONTINUED | OUTPATIENT
Start: 2021-08-26 | End: 2021-08-28 | Stop reason: HOSPADM

## 2021-08-26 RX ORDER — ACETAMINOPHEN 325 MG/1
975 TABLET ORAL EVERY 6 HOURS
Status: DISCONTINUED | OUTPATIENT
Start: 2021-08-26 | End: 2021-08-28 | Stop reason: HOSPADM

## 2021-08-26 RX ORDER — SODIUM CHLORIDE, SODIUM LACTATE, POTASSIUM CHLORIDE, CALCIUM CHLORIDE 600; 310; 30; 20 MG/100ML; MG/100ML; MG/100ML; MG/100ML
INJECTION, SOLUTION INTRAVENOUS CONTINUOUS
Status: DISCONTINUED | OUTPATIENT
Start: 2021-08-26 | End: 2021-08-26 | Stop reason: HOSPADM

## 2021-08-26 RX ORDER — BISACODYL 10 MG
10 SUPPOSITORY, RECTAL RECTAL DAILY PRN
Status: DISCONTINUED | OUTPATIENT
Start: 2021-08-28 | End: 2021-08-28 | Stop reason: HOSPADM

## 2021-08-26 RX ORDER — AZITHROMYCIN 500 MG/5ML
500 INJECTION, POWDER, LYOPHILIZED, FOR SOLUTION INTRAVENOUS
Status: COMPLETED | OUTPATIENT
Start: 2021-08-26 | End: 2021-08-26

## 2021-08-26 RX ORDER — AMOXICILLIN 250 MG
2 CAPSULE ORAL 2 TIMES DAILY
Status: DISCONTINUED | OUTPATIENT
Start: 2021-08-26 | End: 2021-08-28 | Stop reason: HOSPADM

## 2021-08-26 RX ORDER — CITRIC ACID/SODIUM CITRATE 334-500MG
30 SOLUTION, ORAL ORAL
Status: DISCONTINUED | OUTPATIENT
Start: 2021-08-26 | End: 2021-08-26 | Stop reason: HOSPADM

## 2021-08-26 RX ORDER — TRANEXAMIC ACID 10 MG/ML
1 INJECTION, SOLUTION INTRAVENOUS EVERY 30 MIN PRN
Status: DISCONTINUED | OUTPATIENT
Start: 2021-08-26 | End: 2021-08-28 | Stop reason: HOSPADM

## 2021-08-26 RX ORDER — IBUPROFEN 800 MG/1
800 TABLET, FILM COATED ORAL EVERY 6 HOURS
Status: DISCONTINUED | OUTPATIENT
Start: 2021-08-27 | End: 2021-08-28 | Stop reason: HOSPADM

## 2021-08-26 RX ORDER — CARBOPROST TROMETHAMINE 250 UG/ML
250 INJECTION, SOLUTION INTRAMUSCULAR
Status: DISCONTINUED | OUTPATIENT
Start: 2021-08-26 | End: 2021-08-26 | Stop reason: HOSPADM

## 2021-08-26 RX ORDER — OXYTOCIN 10 [USP'U]/ML
INJECTION, SOLUTION INTRAMUSCULAR; INTRAVENOUS
Status: DISCONTINUED
Start: 2021-08-26 | End: 2021-08-26 | Stop reason: HOSPADM

## 2021-08-26 RX ORDER — AMOXICILLIN 250 MG
1 CAPSULE ORAL 2 TIMES DAILY
Status: DISCONTINUED | OUTPATIENT
Start: 2021-08-26 | End: 2021-08-28 | Stop reason: HOSPADM

## 2021-08-26 RX ORDER — OXYTOCIN/0.9 % SODIUM CHLORIDE 30/500 ML
1-24 PLASTIC BAG, INJECTION (ML) INTRAVENOUS CONTINUOUS
Status: DISCONTINUED | OUTPATIENT
Start: 2021-08-26 | End: 2021-08-26

## 2021-08-26 RX ORDER — METOCLOPRAMIDE HYDROCHLORIDE 5 MG/ML
10 INJECTION INTRAMUSCULAR; INTRAVENOUS EVERY 6 HOURS PRN
Status: DISCONTINUED | OUTPATIENT
Start: 2021-08-26 | End: 2021-08-28 | Stop reason: HOSPADM

## 2021-08-26 RX ORDER — HYDROCORTISONE 2.5 %
CREAM (GRAM) TOPICAL 3 TIMES DAILY PRN
Status: DISCONTINUED | OUTPATIENT
Start: 2021-08-26 | End: 2021-08-28 | Stop reason: HOSPADM

## 2021-08-26 RX ORDER — ONDANSETRON 4 MG/1
4 TABLET, ORALLY DISINTEGRATING ORAL EVERY 6 HOURS PRN
Status: DISCONTINUED | OUTPATIENT
Start: 2021-08-26 | End: 2021-08-26

## 2021-08-26 RX ORDER — SODIUM CHLORIDE, SODIUM LACTATE, POTASSIUM CHLORIDE, CALCIUM CHLORIDE 600; 310; 30; 20 MG/100ML; MG/100ML; MG/100ML; MG/100ML
INJECTION, SOLUTION INTRAVENOUS CONTINUOUS PRN
Status: DISCONTINUED | OUTPATIENT
Start: 2021-08-26 | End: 2021-08-26

## 2021-08-26 RX ORDER — CLINDAMYCIN PHOSPHATE 900 MG/50ML
900 INJECTION, SOLUTION INTRAVENOUS
Status: CANCELLED | OUTPATIENT
Start: 2021-08-26

## 2021-08-26 RX ORDER — LIDOCAINE HYDROCHLORIDE 20 MG/ML
INJECTION, SOLUTION EPIDURAL; INFILTRATION; INTRACAUDAL; PERINEURAL PRN
Status: DISCONTINUED | OUTPATIENT
Start: 2021-08-26 | End: 2021-08-26

## 2021-08-26 RX ORDER — LIDOCAINE 40 MG/G
CREAM TOPICAL
Status: DISCONTINUED | OUTPATIENT
Start: 2021-08-26 | End: 2021-08-26

## 2021-08-26 RX ADMIN — LIDOCAINE HYDROCHLORIDE 20 ML: 20 INJECTION, SOLUTION EPIDURAL; INFILTRATION; INTRACAUDAL; PERINEURAL at 18:07

## 2021-08-26 RX ADMIN — SENNOSIDES AND DOCUSATE SODIUM 1 TABLET: 8.6; 5 TABLET ORAL at 23:36

## 2021-08-26 RX ADMIN — Medication 1 MILLI-UNITS/MIN: at 16:15

## 2021-08-26 RX ADMIN — PENICILLIN G POTASSIUM 5 MILLION UNITS: 5000000 POWDER, FOR SOLUTION INTRAMUSCULAR; INTRAPLEURAL; INTRATHECAL; INTRAVENOUS at 06:37

## 2021-08-26 RX ADMIN — KETOROLAC TROMETHAMINE 30 MG: 30 INJECTION, SOLUTION INTRAMUSCULAR at 19:31

## 2021-08-26 RX ADMIN — TRANEXAMIC ACID 1 G: 10 INJECTION, SOLUTION INTRAVENOUS at 18:18

## 2021-08-26 RX ADMIN — FENTANYL CITRATE 100 MCG: 50 INJECTION, SOLUTION INTRAMUSCULAR; INTRAVENOUS at 10:03

## 2021-08-26 RX ADMIN — SODIUM CHLORIDE, POTASSIUM CHLORIDE, SODIUM LACTATE AND CALCIUM CHLORIDE: 600; 310; 30; 20 INJECTION, SOLUTION INTRAVENOUS at 13:38

## 2021-08-26 RX ADMIN — LIDOCAINE HYDROCHLORIDE 20 ML: 20 INJECTION, SOLUTION INFILTRATION; PERINEURAL at 18:07

## 2021-08-26 RX ADMIN — Medication 1 CAPSULE: at 10:32

## 2021-08-26 RX ADMIN — ACETAMINOPHEN 975 MG: 325 TABLET, FILM COATED ORAL at 23:36

## 2021-08-26 RX ADMIN — ONDANSETRON 4 MG: 2 INJECTION INTRAMUSCULAR; INTRAVENOUS at 16:38

## 2021-08-26 RX ADMIN — PENICILLIN G 3 MILLION UNITS: 3000000 INJECTION, SOLUTION INTRAVENOUS at 10:22

## 2021-08-26 RX ADMIN — SODIUM CHLORIDE, POTASSIUM CHLORIDE, SODIUM LACTATE AND CALCIUM CHLORIDE 1000 ML: 600; 310; 30; 20 INJECTION, SOLUTION INTRAVENOUS at 11:39

## 2021-08-26 RX ADMIN — Medication 500 MG: at 18:49

## 2021-08-26 RX ADMIN — SODIUM CHLORIDE, POTASSIUM CHLORIDE, SODIUM LACTATE AND CALCIUM CHLORIDE: 600; 310; 30; 20 INJECTION, SOLUTION INTRAVENOUS at 06:37

## 2021-08-26 RX ADMIN — PENICILLIN G 3 MILLION UNITS: 3000000 INJECTION, SOLUTION INTRAVENOUS at 14:49

## 2021-08-26 RX ADMIN — ONDANSETRON 4 MG: 2 INJECTION INTRAMUSCULAR; INTRAVENOUS at 18:41

## 2021-08-26 RX ADMIN — BUPIVACAINE 20 ML: 13.3 INJECTION, SUSPENSION, LIPOSOMAL INFILTRATION at 20:30

## 2021-08-26 RX ADMIN — Medication 10 ML/HR: at 12:17

## 2021-08-26 RX ADMIN — Medication 2 G: at 18:13

## 2021-08-26 RX ADMIN — SODIUM CHLORIDE, POTASSIUM CHLORIDE, SODIUM LACTATE AND CALCIUM CHLORIDE: 600; 310; 30; 20 INJECTION, SOLUTION INTRAVENOUS at 18:25

## 2021-08-26 RX ADMIN — Medication 50 MCG/MIN: at 18:56

## 2021-08-26 RX ADMIN — BUPIVACAINE HYDROCHLORIDE 40 ML: 2.5 INJECTION, SOLUTION EPIDURAL; INFILTRATION; INTRACAUDAL at 20:30

## 2021-08-26 RX ADMIN — FENTANYL CITRATE 100 MCG: 50 INJECTION, SOLUTION INTRAMUSCULAR; INTRAVENOUS at 11:03

## 2021-08-26 RX ADMIN — SODIUM CHLORIDE, POTASSIUM CHLORIDE, SODIUM LACTATE AND CALCIUM CHLORIDE: 600; 310; 30; 20 INJECTION, SOLUTION INTRAVENOUS at 18:05

## 2021-08-26 RX ADMIN — OXYTOCIN-SODIUM CHLORIDE 0.9% IV SOLN 30 UNIT/500ML 600 ML/HR: 30-0.9/5 SOLUTION at 18:16

## 2021-08-26 NOTE — PLAN OF CARE
Provider notification:  Provider Name: SANJIV Rosen  VICENTE. Notified at 1412 regarding a persistent category II fetal heart rate tracing for 30 minutes.   Baseline rate 125, normal  Variability minimal  Accelerations present  Decelerations present, deceleration type: late , deceleration frequency: recurrent  And prolonged decel [id number]    EFM interpretation suggests absence of concern for fetal metabolic acidemia at this time due to accelerations present    Uterine Activity normal/regular.    Interventions to improve fetal oxygenation for a category II tracing include:IV fluid bolus , increase frequency of assessment, consult Category 2 algorithm, blood pressure check and emotional support    After discussion with provider:Plan reassessment in 20 minutes or sooner if persistent cat 2 minutes

## 2021-08-26 NOTE — PROVIDER NOTIFICATION
08/26/21 1037   Provider Notification   Provider Name/Title SANJIV Rosen CNM   Method of Notification At Bedside   Notification Reason Status Update;Decels     At bedside and given update. VSS. Early and VD noted started at 1015, pt coping with contractions using fentanyl,  and partner support, tub bath, frequent position changes.

## 2021-08-26 NOTE — PROGRESS NOTES
Blood pressure 130/79, pulse 90, temperature 98.2  F (36.8  C), temperature source Oral, resp. rate 20, last menstrual period 12/06/2020, not currently breastfeeding.  Patient Vitals for the past 24 hrs:   BP Temp Temp src Pulse Resp   08/26/21 0228 130/79 -- -- -- 20   08/25/21 2045 130/83 98.2  F (36.8  C) Oral -- 18   08/25/21 1620 (!) 133/91 99  F (37.2  C) Oral 90 18     General appearance: slightly uncomfortable with contractions, was able to rest for a bit.  Agreeable to SVE for plan of care.  Prior to exam, pt reported feeling leaking of fluid.  CONTRACTIONS: every 2-3 minutes and mild  Pitocin- none,  Antibiotics- none  FETAL HEART TONES: continuous EFM- baseline 140 with moderate variability and positive accelerations. No decelerations.  ROM: ruptured clear fluid  PELVIC EXAM: 1.5/ 30%/ Mid/ average/ -2, deviated to maternal R; gush of clear fluid at end of exam  Alves score: 4    ASSESSMENT:  ==============  IUP @ 37w4d for induction of labor.  Indication: gestational hypertension   Fetal Heart Rate Tracing category one  GBS- positive  S/p miso PV x 2 doses    PLAN:  ===========  Discussed likely SROM prior to exam, but with current Alves score would recommend additional cervical ripening.  Could still place forrest bulb, but some increased risk of infection with ROM, or continue with misoprostol if contractions space.  Also reviewed option for continued expectant management as labor may progress with ROM. Discussed option for shower and time off monitors for short time. Pt agreeable.  Ambulation, hydration, position changes, birthing ball/sling and tub options to facilitate labor.  Prophylactic IV antibiotic for positive GBS status reviewed with pt. Agreeable to PCN.   Reevaluate in 1-2 hours prJOSELINE Mccord CNM

## 2021-08-26 NOTE — PROGRESS NOTES
Blood pressure 110/58, pulse 75, temperature 97.6  F (36.4  C), temperature source Oral, resp. rate 18, last menstrual period 12/06/2020, SpO2 98 %, not currently breastfeeding.  Patient Vitals for the past 24 hrs:   BP Temp Temp src Pulse Resp SpO2   08/26/21 1400 110/58 97.6  F (36.4  C) Oral 75 18 98 %   08/26/21 1350 123/73 -- -- -- -- 98 %   08/26/21 1330 126/78 -- -- -- -- --   08/26/21 1306 124/56 97.8  F (36.6  C) Oral -- -- 97 %   08/26/21 1300 127/67 -- -- -- -- --   08/26/21 1242 126/82 -- -- -- -- 98 %   08/26/21 1235 133/66 -- -- 89 20 98 %   08/26/21 1229 126/59 -- -- -- 18 99 %   08/26/21 1227 128/61 -- -- -- -- --   08/26/21 1225 138/64 -- -- -- -- --   08/26/21 1223 (!) 146/65 97.8  F (36.6  C) Oral 95 20 100 %   08/26/21 1221 (!) 148/67 -- -- -- -- --   08/26/21 1219 (!) 154/79 -- -- -- -- --   08/26/21 1217 139/75 -- -- -- -- --   08/26/21 1215 (!) 142/83 -- -- -- -- --   08/26/21 1056 -- 97.6  F (36.4  C) Oral -- -- --   08/26/21 1014 130/75 -- -- -- 22 --   08/26/21 1008 -- 98.7  F (37.1  C) Oral -- -- --   08/26/21 0844 -- 98.4  F (36.9  C) Oral -- -- --   08/26/21 0746 133/89 98.5  F (36.9  C) Oral 83 18 --   08/26/21 0610 -- 98.2  F (36.8  C) Oral -- -- --   08/26/21 0228 130/79 -- -- -- 20 --   08/25/21 2045 130/83 98.2  F (36.8  C) Oral -- 18 --   08/25/21 1620 (!) 133/91 99  F (37.2  C) Oral 90 18 --     General appearance: comfortable with epidural  Called to room for prolonged deceleration x minutes, resolved with position change and IV bolus. Moderate variability  CONTACTIONS: every 2-4 minutes and moderate  Pitocin- none,  Antibiotics- PCN  FETAL HEART TONES: continuous EFM- baseline 125 with moderate variability and prolonged deceleration resolved with position change and IV bolus. Intermittent late deceleration with moderate variability  ROM: clear fluid  PELVIC EXAM:deferred  # Pain Assessment:  Current Pain Score 8/26/2021   Patient currently in pain? denies   Brielle s pain level  was assessed and she currently denies pain with epidural.      ASSESSMENT:  ==============  IUP @ 37w4d in active labor and for induction of labor.  Indication: gestational hypertension   Fetal Heart Rate Tracing category two  GBS- positive- adequately treated  Patient Active Problem List   Diagnosis     Posttraumatic stress disorder, provisional     Drug rash     Gestational hypertension, third trimester     Anxiety     Asthma     Depressive disorder     HRP (high risk pregnancy), third trimester     GBS (group B Streptococcus carrier), +RV culture, currently pregnant     Labor and delivery, indication for care     PLAN:  ===========  Close observation  Frequent position changes to facilitate labor with epidural anesthesia.   Eladia Small, APRN CNM     No foreign body/The wound was explored to base in bloodless field.

## 2021-08-26 NOTE — PLAN OF CARE
Afebrile, VSS. Feeling rectal pressure, SVE by CNM 5/80/0, with plan to start pitocin. Resting comfortably with epidural, frequent position changes by RN. Good support at bedside from  and . EFM moderate variability, accels present, intermittent decels noted (see flowsheets for information/interventions). Denies s/sx of pre-eclampsia. Report to Sherri LEONARDO RN.

## 2021-08-26 NOTE — PROGRESS NOTES
Blood pressure 124/56, pulse 89, temperature 97.8  F (36.6  C), temperature source Oral, resp. rate 20, last menstrual period 12/06/2020, SpO2 97 %, not currently breastfeeding.  Patient Vitals for the past 24 hrs:   BP Temp Temp src Pulse Resp SpO2   08/26/21 1306 124/56 97.8  F (36.6  C) Oral -- -- 97 %   08/26/21 1300 127/67 -- -- -- -- --   08/26/21 1242 126/82 -- -- -- -- 98 %   08/26/21 1235 133/66 -- -- 89 20 98 %   08/26/21 1229 126/59 -- -- -- 18 99 %   08/26/21 1227 128/61 -- -- -- -- --   08/26/21 1225 138/64 -- -- -- -- --   08/26/21 1223 (!) 146/65 97.8  F (36.6  C) Oral 95 20 100 %   08/26/21 1221 (!) 148/67 -- -- -- -- --   08/26/21 1219 (!) 154/79 -- -- -- -- --   08/26/21 1217 139/75 -- -- -- -- --   08/26/21 1215 (!) 142/83 -- -- -- -- --   08/26/21 1056 -- 97.6  F (36.4  C) Oral -- -- --   08/26/21 1014 130/75 -- -- -- 22 --   08/26/21 1008 -- 98.7  F (37.1  C) Oral -- -- --   08/26/21 0844 -- 98.4  F (36.9  C) Oral -- -- --   08/26/21 0746 133/89 98.5  F (36.9  C) Oral 83 18 --   08/26/21 0610 -- 98.2  F (36.8  C) Oral -- -- --   08/26/21 0228 130/79 -- -- -- 20 --   08/25/21 2045 130/83 98.2  F (36.8  C) Oral -- 18 --   08/25/21 1620 (!) 133/91 99  F (37.2  C) Oral 90 18 --     General appearance: comfortable after epidural  Tired, happy she got pain relief. No HA/ vision change/ epigastric pain  CONTACTIONS: every 4 minutes and moderate  Pitocin- none,  Antibiotics- none  FETAL HEART TONES: continuous EFM- baseline 130 with moderate variability and intermittent late decelerations, resolved with position change.  ROM: clear fluid  PELVIC EXAM:deferred  # Pain Assessment:  Current Pain Score 8/26/2021   Patient currently in pain? armando Hannah s pain level was assessed and she currently denies pain with epidural.      ASSESSMENT:  ==============  IUP @ 37w4d in active labor  induction of labor.  Indication: gestational hypertension   Fetal Heart Rate Tracing category two  GBS- positive,  adequately treated  Patient Active Problem List   Diagnosis     Posttraumatic stress disorder, provisional     Drug rash     Gestational hypertension, third trimester     Anxiety     Asthma     Depressive disorder     HRP (high risk pregnancy), third trimester     GBS (group B Streptococcus carrier), +RV culture, currently pregnant     Labor and delivery, indication for care     PLAN:  ===========  Rest now  Frequent position changes to facilitate labor with epidural anesthesia.  Labor augmentation with Pitocin risks and benefits reviewed with pt if contactions space out. Agreeable to plan.  Continue prophylactic IV antibiotic PCN for positive GBS status.   Eladia Small, APRN CNM

## 2021-08-26 NOTE — PROGRESS NOTES
Blood pressure 130/83, pulse 90, temperature 98.2  F (36.8  C), temperature source Oral, resp. rate 18, last menstrual period 12/06/2020, not currently breastfeeding.  Patient Vitals for the past 24 hrs:   BP Temp Temp src Pulse Resp   08/25/21 2045 130/83 98.2  F (36.8  C) Oral -- 18   08/25/21 1620 (!) 133/91 99  F (37.2  C) Oral 90 18     General appearance: feeling more uncomfortable with contractions.  Has been trying multiple positions (birthing ball, swaying at bedside, R lateral in bed).  CONTRACTIONS: every 1.5-5 minutes and mild  Pitocin- none,  Antibiotics- none  FETAL HEART TONES: continuous EFM- baseline 130 with moderate variability and positive accelerations. No decelerations.  ROM: not ruptured  PELVIC EXAM:deferred    ASSESSMENT:  ==============  IUP @ 37w4d for induction of labor.  Indication: gestational hypertension   Fetal Heart Rate Tracing overall category one  GBS- positive- not treated until active labor  S/p miso PV x 2 doses    PLAN:  ===========  Discussed current contraction pattern and inability to continue misoprostol dosing at this time.  Discussed options for cervical exam to determine plan of care for continued cervical ripening with Espinoza bulb placement or continued expectant management.  Pt would like to continue to monitor at this time.   Ambulation, hydration, position changes, birthing ball/sling and tub options to facilitate labor.  Continue cervical ripening with vaginal Misoprostol per protocol, if contraction pattern allows.  Reevaluate in 2 hours JOSELINE Herrera CNM

## 2021-08-26 NOTE — ANESTHESIA PROCEDURE NOTES
Epidural catheter Procedure Note  Pre-Procedure   Staff -        Anesthesiologist:  Sue Nair MD       Resident/Fellow: Pilo Grande MD       Performed By: resident       Location: OB       Procedure Start/Stop Times: 8/26/2021 12:00 PM       Pre-Anesthestic Checklist: patient identified, IV checked, risks and benefits discussed, informed consent, monitors and equipment checked, pre-op evaluation, at physician/surgeon's request and post-op pain management  Timeout:       Correct Patient: Yes        Correct Procedure: Yes        Correct Site: Yes        Correct Position: Yes   Procedure Documentation  Procedure: epidural catheter       Diagnosis: labor analgesia        Patient Position: sitting       Skin prep: Chloraprep       Local skin infiltrated with 3 mL of 1% lidocaine.        Insertion Site: L3-4. (midline approach).       Technique: LORT saline and LORT air        FREDERICK at 6 cm.       Needle Type: Souktely needle       Needle Gauge: 17.        Needle Length (Inches): 3.5        Catheter: 19 G.         Catheter threaded easily.         6 cm epidural space.         Threaded 12 cm at skin.         # of attempts: 1 and  # of redirects:  2    Assessment/Narrative         Paresthesias: No.       Test dose of 3 mL lidocaine 1.5% w/ 1:200,000 epinephrine at 12:14 CDT.         Test dose negative, 3 minutes after injection, for signs of intravascular, subdural, or intrathecal injection.       Insertion/Infusion Method: LORT saline and LORT air       Aspiration negative for Heme or CSF via Epidural Catheter.       Sensory Level Left: T6 and L4.       Sensory Level Right: T6 and L4.    Medication(s) Administered   0.125% Bupivacaine + 2 mcg/mL Fentanyl via CADD (Epidural), 10 mL  Medication Administration Time: 8/26/2021 12:18 PM

## 2021-08-26 NOTE — PLAN OF CARE
37.3 patient transferred from [place] for IOL due to GHTN. Denies objective symptoms of pre-e at this time aside from swelling. Denies leaking of fluid, VB, or feeling contractions. +FM. EFM placed with verbal consent. Partner Porter present and supportive. Esha CNM aware of arrival.

## 2021-08-26 NOTE — PROVIDER NOTIFICATION
08/25/21 1650   Provider Notification   Provider Name/Title Esha Reinisch CNM   Method of Notification At Bedside   Esha CNM at bedside to greet and evaluate patient. SVE FT/60/-2 with a Alves score of 4. Recommends misoprostol at this time. Patient and partner questions asked and answered, agree with plan of care.

## 2021-08-26 NOTE — PLAN OF CARE
VSS.  Blood pressures WNL.  Patient denies s/s of pre-e.  IOL for GHTN.  Vag miso given x 2.  Luana regularly q1-4 mins.  See flow sheet for FHR.  Patient utilizing position changes, hot packs, and shower for pain management.  SROM of clear fluid at 0605.  Penicillin started for positive GBS.  CNM ok with patient removing monitors for shower this morning.  Spouse at bedside.  Birth plan in chart.  Continue with plan of care.

## 2021-08-26 NOTE — PLAN OF CARE
Late entry.     Pt requested and received epidural for relief of labor pain. Now resting comfortably and denying pain with contractions. BP normal to mild range hypertension. Continue to monitor per protocol.

## 2021-08-26 NOTE — PROGRESS NOTES
Blood pressure 130/83, pulse 90, temperature 98.2  F (36.8  C), temperature source Oral, resp. rate 18, last menstrual period 12/06/2020, not currently breastfeeding.  Patient Vitals for the past 24 hrs:   BP Temp Temp src Pulse Resp   08/25/21 2045 130/83 98.2  F (36.8  C) Oral -- 18   08/25/21 1620 (!) 133/91 99  F (37.2  C) Oral 90 18     General appearance: comfortable, reports mild cramping. Sitting on birthing ball.  CONTRACTIONS: every 2-6 minutes, mild and not all felt by patient  Pitocin- none,  Antibiotics- none  FETAL HEART TONES: continuous EFM- baseline 135 with moderate variability and positive accelerations. No decelerations.  ROM: not ruptured  PELVIC EXAM: deferred    ASSESSMENT:  ==============  IUP @ 37w3d for induction of labor.  Indication: gestational hypertension   Fetal Heart Rate Tracing category one  GBS- positive- not treated until active labor  S/p miso PV x 1 dose    PLAN:  ===========  Ambulation, hydration, position changes, birthing ball/sling and tub options to facilitate labor.  Discussed current contraction pattern, plan to continue cervical ripening with vaginal misoprostol.  Reevaluate in 2-4 hours JOSELINE Herrera CNM

## 2021-08-26 NOTE — PROGRESS NOTES
Blood pressure 133/89, pulse 83, temperature 98.5  F (36.9  C), temperature source Oral, resp. rate 18, last menstrual period 12/06/2020, not currently breastfeeding.  Patient Vitals for the past 24 hrs:   BP Temp Temp src Pulse Resp   08/26/21 0746 133/89 98.5  F (36.9  C) Oral 83 18   08/26/21 0610 -- 98.2  F (36.8  C) Oral -- --   08/26/21 0228 130/79 -- -- -- 20   08/25/21 2045 130/83 98.2  F (36.8  C) Oral -- 18   08/25/21 1620 (!) 133/91 99  F (37.2  C) Oral 90 18     General appearance: uncomfortable with contractions  Had two doses vaginal misoprostol with SROM clear fluid at 0600. Began strong regular contr shortly after that. Now breathing with cont on hands and knees.  supportive.  on her way  CONTACTIONS: every 2-3 minutes, moderate and cramping  Pitocin- none,  Antibiotics- none  FETAL HEART TONES: continuous EFM- baseline 140 with moderate variability. No decelerations.  ROM: clear fluid  PELVIC EXAM:deferred  # Pain Assessment:  Current Pain Score 8/26/2021   Patient currently in pain? yes   - Brielle is experiencing pain due to labor. Pain management was discussed with Brielle and her spouse and the plan was created in a collaborative fashion.  Brielle's response to the current recommendations: engaged  - planning unmedicated birth  Results for orders placed or performed during the hospital encounter of 08/25/21   CBC with platelets differential     Status: Abnormal    Narrative    The following orders were created for panel order CBC with platelets differential.  Procedure                               Abnormality         Status                     ---------                               -----------         ------                     CBC with platelets and d...[[id number]]  Abnormal            Final result                 Please view results for these tests on the individual orders.   Protein  random urine with Creat Ratio     Status: Abnormal   Result Value Ref Range    Total Protein Random  Urine g/L 0.11 g/L    Total Protein Urine g/gr Creatinine 0.22 (H) 0.00 - 0.20 g/g Cr    Creatinine Urine mg/dL 50 mg/dL   ALT     Status: Normal   Result Value Ref Range    ALT 19 0 - 50 U/L   AST     Status: Normal   Result Value Ref Range    AST 22 0 - 45 U/L   Uric acid     Status: Normal   Result Value Ref Range    Uric Acid 4.0 2.6 - 6.0 mg/dL   Basic metabolic panel     Status: Normal   Result Value Ref Range    Sodium 138 133 - 144 mmol/L    Potassium 3.8 3.4 - 5.3 mmol/L    Chloride 109 94 - 109 mmol/L    Carbon Dioxide (CO2) 22 20 - 32 mmol/L    Anion Gap 7 3 - 14 mmol/L    Urea Nitrogen 7 7 - 30 mg/dL    Creatinine 0.54 0.52 - 1.04 mg/dL    Calcium 8.8 8.5 - 10.1 mg/dL    Glucose 76 70 - 99 mg/dL    GFR Estimate >90 >60 mL/min/1.73m2   Asymptomatic COVID-19 Virus (Coronavirus) by PCR Nasopharyngeal     Status: Normal    Specimen: Nasopharyngeal; Swab   Result Value Ref Range    SARS CoV2 PCR Negative Negative    Narrative    Testing was performed using the solomon  SARS-CoV-2 & Influenza A/B Assay on the solomon  [employer]  System.  This test should be ordered for the detection of SARS-COV-2 in individuals who meet SARS-CoV-2 clinical and/or epidemiological criteria. Test performance is unknown in asymptomatic patients.  This test is for in vitro diagnostic use under the FDA EUA for laboratories certified under CLIA to perform moderate and/or high complexity testing. This test has not been FDA cleared or approved.  A negative test does not rule out the presence of PCR inhibitors in the specimen or target RNA in concentration below the limit of detection for the assay. The possibility of a false negative should be considered if the patient's recent exposure or clinical presentation suggests COVID-19.  [facility] are certified under the Clinical Laboratory Improvement Amendments of 1988 (CLIA-88) as qualified to perform moderate and/or high complexity laboratory testing.   CBC with platelets and  differential     Status: Abnormal   Result Value Ref Range    WBC Count 12.2 (H) 4.0 - 11.0 10e3/uL    RBC Count 4.91 3.80 - 5.20 10e6/uL    Hemoglobin 13.7 11.7 - 15.7 g/dL    Hematocrit 40.9 35.0 - 47.0 %    MCV 83 78 - 100 fL    MCH 27.9 26.5 - 33.0 pg    MCHC 33.5 31.5 - 36.5 g/dL    RDW 13.6 10.0 - 15.0 %    Platelet Count 262 150 - 450 10e3/uL    % Neutrophils 69 %    % Lymphocytes 20 %    % Monocytes 8 %    % Eosinophils 1 %    % Basophils 0 %    % Immature Granulocytes 2 %    NRBCs per 100 WBC 0 <1 /100    Absolute Neutrophils 8.6 (H) 1.6 - 8.3 10e3/uL    Absolute Lymphocytes 2.4 0.8 - 5.3 10e3/uL    Absolute Monocytes 0.9 0.0 - 1.3 10e3/uL    Absolute Eosinophils 0.1 0.0 - 0.7 10e3/uL    Absolute Basophils 0.1 0.0 - 0.2 10e3/uL    Absolute Immature Granulocytes 0.2 (H) <=0.0 10e3/uL    Absolute NRBCs 0.0 10e3/uL   Adult Type and Screen     Status: None   Result Value Ref Range    ABO/RH(D) A POS     Antibody Screen Negative Negative    SPECIMEN EXPIRATION DATE [id number]    ABO/Rh type and screen     Status: None    Narrative    The following orders were created for panel order ABO/Rh type and screen.  Procedure                               Abnormality         Status                     ---------                               -----------         ------                     Adult Type and Screen[[id number]]                            Final result                 Please view results for these tests on the individual orders.       ASSESSMENT:  ==============  IUP @ 37w4d for induction of labor.  Indication: gestational hypertension   Fetal Heart Rate Tracing category one  GBS- negative  Patient Active Problem List   Diagnosis     Posttraumatic stress disorder, provisional     Drug rash     Gestational hypertension, third trimester     Anxiety     Asthma     Depressive disorder     HRP (high risk pregnancy), third trimester     GBS (group B Streptococcus carrier), +RV culture, currently pregnant     Labor  and delivery, indication for care     PLAN:  ===========  Ambulation, hydration, position changes, birthing ball/sling and tub options to facilitate labor.  Pain medication options of Nitrous Oxide, Fentanyl IV and epidural reviewed with pt. Pt is interested in unmedicated birth. Considering nitrous oxide   JOSELINE Lopez CNM

## 2021-08-26 NOTE — ANESTHESIA PREPROCEDURE EVALUATION
Anesthesia Pre-Procedure Evaluation    Patient: Brielle Pearson   MRN: [id number] : 1995        Preoperative Diagnosis: * No surgery found *   Procedure :      Past Medical History:   Diagnosis Date     CARDIOVASCULAR SCREENING; LDL GOAL LESS THAN 160 2013     Depression with anxiety      Depressive disorder 2021     Gestational (pregnancy-induced) hypertension without significant proteinuria, complicating childbirth 2021     Panic attacks      Pyelonephritis      Strawberry nevus     above (R) ear      Past Surgical History:   Procedure Laterality Date     NO HISTORY OF SURGERY        Allergies   Allergen Reactions     Keflex [Cephalexin]       Social History     Tobacco Use     Smoking status: Never Smoker     Smokeless tobacco: Never Used   Substance Use Topics     Alcohol use: Not Currently      Wt Readings from Last 1 Encounters:   21 82.1 kg (181 lb)        Anesthesia Evaluation   Pt has had prior anesthetic. Type: General.    No history of anesthetic complications       ROS/MED HX  ENT/Pulmonary:     (+) asthma     Neurologic:  - neg neurologic ROS     Cardiovascular: Comment: -Gestational hypertension     (+) hypertension (gestational )----- (-) murmur and wheezes   METS/Exercise Tolerance: >4 METS    Hematologic:  - neg hematologic  ROS  (-) history of blood transfusion   Musculoskeletal:  - neg musculoskeletal ROS     GI/Hepatic:  - neg GI/hepatic ROS     Renal/Genitourinary:  - neg Renal ROS     Endo:  - neg endo ROS     Psychiatric/Substance Use:     (+) psychiatric history anxiety and depression     Infectious Disease:  - neg infectious disease ROS     Malignancy:  - neg malignancy ROS     Other: Comment: GBS+    (-) previous        Physical Exam    Airway        Mallampati: II   TM distance: > 3 FB   Neck ROM: full   Mouth opening: > 3 cm    Respiratory Devices and Support         Dental  no notable dental history         Cardiovascular   cardiovascular exam normal       (-) no murmur    Pulmonary   pulmonary exam normal        (-) no wheezes        OUTSIDE LABS:  CBC:   Lab Results   Component Value Date    WBC 12.2 (H) 08/25/2021    WBC 13.9 (H) 08/22/2021    HGB 13.7 08/25/2021    HGB 13.6 08/22/2021    HCT 40.9 08/25/2021    HCT 39.7 08/22/2021     08/25/2021     08/22/2021     BMP:   Lab Results   Component Value Date     08/25/2021    POTASSIUM 3.8 08/25/2021    CHLORIDE 109 08/25/2021    CO2 22 08/25/2021    BUN 7 08/25/2021    CR 0.54 08/25/2021    CR 0.56 08/22/2021    GLC 76 08/25/2021     COAGS: No results found for: PTT, INR, FIBR  POC:   Lab Results   Component Value Date    HCG Negative 04/05/2019     HEPATIC:   Lab Results   Component Value Date    ALT 19 08/25/2021    AST 22 08/25/2021     OTHER:   Lab Results   Component Value Date    SELMA 8.8 08/25/2021    TSH 2.86 04/05/2019       Anesthesia Plan    ASA Status:  3      Anesthesia Type: Epidural.              Consents    Anesthesia Plan(s) and associated risks, benefits, and realistic alternatives discussed. Questions answered and patient/representative(s) expressed understanding.     - Discussed with:  Patient, Spouse         Postoperative Care            Comments:           neg OB ROS.       Pilo Grande MD

## 2021-08-26 NOTE — DISCHARGE SUMMARY
DELIVERY DISCHARGE SUMMARY    Admit date: 2021  Discharge date: 21     Admit Dx:   - 26 year old y/o  at 37w3d  - GHTN  - Asthma    Discharge Dx:  - Same as above, s/p STAT primary low transverse  section  - Terminal bradycardia  - Acute blood loss secondary to surgical blood loss, asymptomatic    Procedures:  - STAT primary low transverse  section with double layer closure via Pfannenstiel incision  - Epidural analgesia    Admit HPI:  Ms. Brielle Pearson is a 26 year old  at 37w3d who was admitted on 2021 for IOL for GHTN to the CN service.  Please see her admit H&P for full details of her PMH, PSH, Meds, Allergies and exam on admit.    Hospital course:  For her induction she underwent cervical ripening and then augmented with pitocin. She began to have category II FHT just as she progressed to complete. The FHR resolved with position changes and she attempted pushing once with prolonged decel and was taken to the OR for STAT c/s. HR improved in the OR and she attempted another push with terminal fetal bradycardia and a STAT c/s was performed.    EBL from the delivery was 260. Op note reports the following findings:  A single liveborn female infant with Apgars of 2 and 9 was delivered from the stefan breech presentation after rotation from cephalic to breech during elevation of head to hysterotomy. Hysterotomy with significant extension into the left broad ligament requiring Jose stitch. Bladder appeared to not be draining during case - Espinoza discovered to have deflated balloon intraoperatively. Espinoza upon initial placement with pink-tinged urine; clearer at end of case. Otherwise normal appearing uterus, fallopian tubes, ovaries.  No nuchal cord.  Clear amniontic fluid. No abdominal wall adhesions. No intraabdominal adhesions    Please see her  Section Operative Note for full details regarding her delivery.    Her postoperative course was uncomplicated.  On POD#2, she was meeting all of her postpartum goals and deemed stable for discharge. She was voiding without difficulty, tolerating a regular diet without nausea and vomiting, her pain was well controlled on oral pain medicines and her lochia was appropriate. Her hemoglobin after delivery was 11.2. Her Rh status was positive and Rhogam was not indicated.     Discharge Medications:     Review of your medicines        START taking        Dose / Directions   acetaminophen 325 MG tablet  Commonly known as: TYLENOL  Used for:  delivery delivered      Dose: 650 mg  Take 2 tablets (650 mg) by mouth every 6 hours as needed for mild pain Start after Delivery.  Quantity: 100 tablet  Refills: 0     ibuprofen 600 MG tablet  Commonly known as: ADVIL/MOTRIN  Used for:  delivery delivered      Dose: 600 mg  Take 1 tablet (600 mg) by mouth every 6 hours as needed for moderate pain Start after delivery  Quantity: 60 tablet  Refills: 0     oxyCODONE 5 MG tablet  Commonly known as: ROXICODONE  Used for:  delivery delivered      Dose: 5 mg  Take 1 tablet (5 mg) by mouth every 6 hours as needed for moderate to severe pain  Quantity: 3 tablet  Refills: 0     senna-docusate 8.6-50 MG tablet  Commonly known as: SENOKOT-S/PERICOLACE  Used for:  delivery delivered      Dose: 1 tablet  Take 1 tablet by mouth daily Start after delivery.  Quantity: 100 tablet  Refills: 0            CONTINUE these medicines which have NOT CHANGED        Dose / Directions   ferrous sulfate 325 (65 Fe) MG tablet  Commonly known as: FEROSUL      Dose: 325 mg  Take 325 mg by mouth daily (with breakfast)  Refills: 0     fish oil-omega-3 fatty acids 1000 MG capsule      Dose: 2 g  Take 2 g by mouth daily  Refills: 0     lactobacillus rhamnosus (GG) capsule      Dose: 1 capsule  Take 1 capsule by mouth 2 times daily  Refills: 0     prenatal multivitamin w/iron 27-0.8 MG tablet      Dose: 1 tablet  Take 1 tablet by mouth daily  Refills:  0     Vitamin B 12 100 MCG Lozg      Refills: 0               Where to get your medicines        These medications were sent to [facility] [place], [place] - [address]  [address] [facility] [address], [facility] [address]      Phone: [phone]     acetaminophen 325 MG tablet    ibuprofen 600 MG tablet    oxyCODONE 5 MG tablet    senna-docusate 8.6-50 MG tablet       Discharge/Disposition:  Brielle Pearson was discharged to home in stable condition with the following instructions/medications:  1) Call for temperature > 100.4, bright red vaginal bleeding >1 pad an hour x 2 hours, foul smelling vaginal discharge, pain not controlled by usual oral pain meds, persistent nausea and vomiting not controlled on medications, drainage or redness from incision site  2) She declined contraception.  3) For feeding she decided to breast feed.  4) She was instructed to follow-up with her primary OB in 6 weeks for a routine postpartum visit and 1 week for a BP check.  5) Discharge activity:  No heavy lifting >15 lbs or strenuous activity for 6 weeks, pelvic rest for 6 weeks, no driving or operating machinery while on narcotics.    Staffed with Dr. Green.    Negro Powers MD  Ob/Gyn Resident, PGY-1  08/28/21 12:01 PM     Staff MD Note    I evaluated the patient on the day of discharge.  We reviewed discharge instructions.  Patient stable for discharge.    Nithya Green MD

## 2021-08-26 NOTE — PROGRESS NOTES
Labor Progress Note:    Patient Name:  Brielle Pearson  :      1995  MRN:      [id number]    Assessment:    27 yo  at 37w4d in active labor  IOL for gestational hypertension  Currently Normotensive    Fetal Heart Rate Tracing category two  GBS- positive, adequately treated      Patient Active Problem List   Diagnosis     Posttraumatic stress disorder, provisional     Drug rash     Gestational hypertension, third trimester     Anxiety     Asthma     Depressive disorder     HRP (high risk pregnancy), third trimester     GBS (group B Streptococcus carrier), +RV culture, currently pregnant     Labor and delivery, indication for care        Plan:   -Routine CNM support & management. Encourage position changes, rest as desired.  -Discussed recommendation to initiate low dose pitocin given spacing contractions.   -Brielle consents to starting pitocin, initiate per low dose protocol  -Continue with GBS prophylaxis  -Anticipate progress and NSVB.   -Reevaluate progress in 2-3 hours or sooner with a change in status.    Subjective:  Brielle Pearson is feeling pressure and is requesting to be checked. She continues to feel like she needs to have a bowel movement, but has not been able to pass any stool.   She is well supported by her clair Weston and her partner Porter.   Taking sips of juice and coconut water.   Denies HA, visual changes, RUQ pain      Objective:  /69   Pulse 75   Temp 97.8  F (36.6  C) (Oral)   Resp 18   LMP 2020 (Exact Date)   SpO2 97%       Patient Vitals for the past 24 hrs:   BP Temp Temp src Pulse Resp SpO2   21 1452 -- 97.8  F (36.6  C) Oral -- -- --   21 1444 128/69 -- -- -- -- 97 %   21 1430 128/66 -- -- -- -- --   21 1414 111/57 -- -- -- -- --   21 1400 110/58 97.6  F (36.4  C) Oral 75 18 98 %   21 1350 123/73 -- -- -- -- 98 %   21 1330 126/78 -- -- -- -- --   21 1306 124/56 97.8  F (36.6  C) Oral -- -- 97 %   21  1300 127/67 -- -- -- -- --   08/26/21 1242 126/82 -- -- -- -- 98 %   08/26/21 1235 133/66 -- -- 89 20 98 %   08/26/21 1229 126/59 -- -- -- 18 99 %   08/26/21 1227 128/61 -- -- -- -- --   08/26/21 1225 138/64 -- -- -- -- --   08/26/21 1223 (!) 146/65 97.8  F (36.6  C) Oral 95 20 100 %   08/26/21 1221 (!) 148/67 -- -- -- -- --   08/26/21 1219 (!) 154/79 -- -- -- -- --   08/26/21 1217 139/75 -- -- -- -- --   08/26/21 1215 (!) 142/83 -- -- -- -- --   08/26/21 1056 -- 97.6  F (36.4  C) Oral -- -- --   08/26/21 1014 130/75 -- -- -- 22 --   08/26/21 1008 -- 98.7  F (37.1  C) Oral -- -- --   08/26/21 0844 -- 98.4  F (36.9  C) Oral -- -- --   08/26/21 0746 133/89 98.5  F (36.9  C) Oral 83 18 --   08/26/21 0610 -- 98.2  F (36.8  C) Oral -- -- --   08/26/21 0228 130/79 -- -- -- 20 --   08/25/21 2045 130/83 98.2  F (36.8  C) Oral -- 18 --   08/25/21 1620 (!) 133/91 99  F (37.2  C) Oral 90 18 --     General appearance: comfortable with epidural anesthesia    CONTACTIONS: every 4 minutes, moderate  Pitocin- none,  Antibiotics- PCN  FETAL HEART TONES: continuous EFM- baseline 135 with moderate variability and one mild variable deceleration.  ROM: clear fluid 0600  PELVIC EXAM: 5cm/80%/0 station, mid    # Pain Assessment:  Current Pain Score 8/26/2021   Patient currently in pain? yes   Pain well controlled with epidural anesthesia      TT with patient  10 mn >50% time spent in counseling    Provider:  Britany Butt CNM      Date:  8/26/2021  Time:  3:45 PM

## 2021-08-26 NOTE — PROGRESS NOTES
Blood pressure 127/71, pulse 88, temperature 98.5  F (36.9  C), temperature source Oral, resp. rate 20, last menstrual period 12/06/2020, SpO2 95 %, not currently breastfeeding.  Patient Vitals for the past 24 hrs:   BP Temp Temp src Pulse Resp SpO2   08/26/21 1730 -- 98.5  F (36.9  C) Oral -- 20 95 %   08/26/21 1645 127/71 -- -- -- 20 96 %   08/26/21 1630 -- 98.4  F (36.9  C) Oral -- 20 96 %   08/26/21 1615 (!) 141/86 -- -- -- 18 97 %   08/26/21 1545 133/74 98.4  F (36.9  C) Oral 88 18 95 %   08/26/21 1452 -- 97.8  F (36.6  C) Oral -- -- --   08/26/21 1444 128/69 -- -- -- -- 97 %   08/26/21 1430 128/66 -- -- -- -- --   08/26/21 1414 111/57 -- -- -- -- --   08/26/21 1400 110/58 97.6  F (36.4  C) Oral 75 18 98 %   08/26/21 1350 123/73 -- -- -- -- 98 %   08/26/21 1330 126/78 -- -- -- -- --   08/26/21 1306 124/56 97.8  F (36.6  C) Oral -- -- 97 %   08/26/21 1300 127/67 -- -- -- -- --   08/26/21 1242 126/82 -- -- -- -- 98 %   08/26/21 1235 133/66 -- -- 89 20 98 %   08/26/21 1229 126/59 -- -- -- 18 99 %   08/26/21 1227 128/61 -- -- -- -- --   08/26/21 1225 138/64 -- -- -- -- --   08/26/21 1223 (!) 146/65 97.8  F (36.6  C) Oral 95 20 100 %   08/26/21 1221 (!) 148/67 -- -- -- -- --   08/26/21 1219 (!) 154/79 -- -- -- -- --   08/26/21 1217 139/75 -- -- -- -- --   08/26/21 1215 (!) 142/83 -- -- -- -- --   08/26/21 1056 -- 97.6  F (36.4  C) Oral -- -- --   08/26/21 1014 130/75 -- -- -- 22 --   08/26/21 1008 -- 98.7  F (37.1  C) Oral -- -- --   08/26/21 0844 -- 98.4  F (36.9  C) Oral -- -- --   08/26/21 0746 133/89 98.5  F (36.9  C) Oral 83 18 --   08/26/21 0610 -- 98.2  F (36.8  C) Oral -- -- --   08/26/21 0228 130/79 -- -- -- 20 --   08/25/21 2045 130/83 98.2  F (36.8  C) Oral -- 18 --     General appearance: comfortable  Sleeping, not feeling pressure.   CONTACTIONS: every 3 minutes and moderate  Pitocin- 3 mu/min.,  Antibiotics- PCN  FETAL HEART TONES: continuous EFM- baseline 145 with moderate variability and positive  accelerations. Intermittent variable decelerations.  ROM: clear fluid  PELVIC EXAM:deferred    ASSESSMENT:  ==============  IUP @ 37w4d in active labor and for induction of labor.  Indication: gestational hypertension   Fetal Heart Rate Tracing category ttwo  GBS- positive- adequately treated  Patient Active Problem List   Diagnosis     Posttraumatic stress disorder, provisional     Drug rash     Gestational hypertension, third trimester     Anxiety     Asthma     Depressive disorder     HRP (high risk pregnancy), third trimester     GBS (group B Streptococcus carrier), +RV culture, currently pregnant     Labor and delivery, indication for care     PLAN:  ===========  Frequent position changes to facilitate labor with epidural anesthesia.  Close observation  Reevaluate in 2 hours prn   Eladia Small, JOSELINE ZHANG

## 2021-08-26 NOTE — PROGRESS NOTES
Blood pressure 130/75, pulse 83, temperature 97.6  F (36.4  C), temperature source Oral, resp. rate 22, last menstrual period 12/06/2020, not currently breastfeeding.  Patient Vitals for the past 24 hrs:   BP Temp Temp src Pulse Resp   08/26/21 1056 -- 97.6  F (36.4  C) Oral -- --   08/26/21 1014 130/75 -- -- -- 22   08/26/21 1008 -- 98.7  F (37.1  C) Oral -- --   08/26/21 0844 -- 98.4  F (36.9  C) Oral -- --   08/26/21 0746 133/89 98.5  F (36.9  C) Oral 83 18   08/26/21 0610 -- 98.2  F (36.8  C) Oral -- --   08/26/21 0228 130/79 -- -- -- 20   08/25/21 2045 130/83 98.2  F (36.8  C) Oral -- 18   08/25/21 1620 (!) 133/91 99  F (37.2  C) Oral 90 18     General appearance: uncomfortable     CONTACTIONS: every 2-3 minutes  Pitocin- none,  Antibiotics- PCN  FETAL HEART TONES: continuous EFM- baseline 130 with moderate variability and positive accelerations. Early decelerations noted  ROM: clear fluid 0600  PELVIC EXAM: 3cm/80%/-2 soft, mid position  Alves score: 7    # Pain Assessment:  Current Pain Score 8/26/2021   Patient currently in pain? yes   Brielle kilpatrick pain level was assessed and she currently reports moderate pain. She has had two doses of Fentanyl and is now requesting an epidural      ASSESSMENT:  ==============  IUP @ 37w4d  IOL for gHTN  Normotensive today   Normal HELLP labs   Fetal Heart Rate Tracing category one  GBS- positive- antibiotics initiated, s/p two doses    Patient Active Problem List   Diagnosis     Posttraumatic stress disorder, provisional     Drug rash     Gestational hypertension, third trimester     Anxiety     Asthma     Depressive disorder     HRP (high risk pregnancy), third trimester     GBS (group B Streptococcus carrier), +RV culture, currently pregnant     Labor and delivery, indication for care     PLAN:  ===========  Pain medication options of Nitrous Oxide, Fentanyl IV and epidural reviewed with pt. Pt has had two doses of Fentanyl and now desires an epidural.   Continue to receive  report from clair Salazar and partner Porter  Will reassess prn or in 2-3 hours  Encourage position changes, anticipate vaginal birth    Britany Butt CNM

## 2021-08-26 NOTE — PROGRESS NOTES
Blood pressure (!) 133/91, pulse 90, temperature 99  F (37.2  C), temperature source Oral, resp. rate 18, last menstrual period 12/06/2020, not currently breastfeeding.  Patient Vitals for the past 24 hrs:   BP Temp Temp src Pulse Resp   08/25/21 1620 (!) 133/91 99  F (37.2  C) Oral 90 18     General appearance: comfortable, feeling occ mild contractions.    CONTRACTIONS: every 5-8 minutes and mild  Pitocin- none,  Antibiotics- none  FETAL HEART TONES: continuous EFM- baseline 125 with moderate variability and positive accelerations. No decelerations.  ROM: not ruptured  PELVIC EXAM:deferred    ASSESSMENT:  ==============  IUP @ 37w3d for induction of labor.  Indication: gestational hypertension   Fetal Heart Rate Tracing category one  GBS- positive- not treated until active labor    PLAN:  ===========  Ambulation, hydration, position changes, birthing ball/sling and tub options to facilitate labor.  Pain medication options of sterile water papules, Nitrous Oxide, Fentanyl IV and epidural reviewed with pt. Pt is interested in non-pharmacologic options, but also open to medication if needed.   Continue cervical ripening with vaginal Misoprostol  Plan for prophylactic IV antibiotic for positive GBS status with active labor.  Pt made aware of options for therapeutic sleep.  Reevaluate in 2-4 hours JOSELINE Herrera CNM

## 2021-08-27 LAB — HGB BLD-MCNC: 11.2 G/DL (ref 11.7–15.7)

## 2021-08-27 PROCEDURE — 36415 COLL VENOUS BLD VENIPUNCTURE: CPT | Performed by: STUDENT IN AN ORGANIZED HEALTH CARE EDUCATION/TRAINING PROGRAM

## 2021-08-27 PROCEDURE — 85018 HEMOGLOBIN: CPT | Performed by: STUDENT IN AN ORGANIZED HEALTH CARE EDUCATION/TRAINING PROGRAM

## 2021-08-27 PROCEDURE — 250N000011 HC RX IP 250 OP 636: Performed by: STUDENT IN AN ORGANIZED HEALTH CARE EDUCATION/TRAINING PROGRAM

## 2021-08-27 PROCEDURE — 120N000002 HC R&B MED SURG/OB UMMC

## 2021-08-27 PROCEDURE — 250N000013 HC RX MED GY IP 250 OP 250 PS 637: Performed by: STUDENT IN AN ORGANIZED HEALTH CARE EDUCATION/TRAINING PROGRAM

## 2021-08-27 RX ORDER — IBUPROFEN 600 MG/1
600 TABLET, FILM COATED ORAL EVERY 6 HOURS PRN
Qty: 60 TABLET | Refills: 0 | Status: SHIPPED | OUTPATIENT
Start: 2021-08-27

## 2021-08-27 RX ORDER — AMOXICILLIN 250 MG
1 CAPSULE ORAL DAILY
Qty: 100 TABLET | Refills: 0 | Status: SHIPPED | OUTPATIENT
Start: 2021-08-27

## 2021-08-27 RX ORDER — ACETAMINOPHEN 325 MG/1
650 TABLET ORAL EVERY 6 HOURS PRN
Qty: 100 TABLET | Refills: 0 | Status: SHIPPED | OUTPATIENT
Start: 2021-08-27

## 2021-08-27 RX ADMIN — ACETAMINOPHEN 975 MG: 325 TABLET, FILM COATED ORAL at 05:55

## 2021-08-27 RX ADMIN — SENNOSIDES AND DOCUSATE SODIUM 1 TABLET: 8.6; 5 TABLET ORAL at 08:46

## 2021-08-27 RX ADMIN — SIMETHICONE 80 MG: 80 TABLET, CHEWABLE ORAL at 13:16

## 2021-08-27 RX ADMIN — KETOROLAC TROMETHAMINE 30 MG: 30 INJECTION, SOLUTION INTRAMUSCULAR; INTRAVENOUS at 02:13

## 2021-08-27 RX ADMIN — OXYCODONE HYDROCHLORIDE 5 MG: 5 TABLET ORAL at 05:57

## 2021-08-27 RX ADMIN — OXYCODONE HYDROCHLORIDE 5 MG: 5 TABLET ORAL at 11:51

## 2021-08-27 RX ADMIN — KETOROLAC TROMETHAMINE 30 MG: 30 INJECTION, SOLUTION INTRAMUSCULAR; INTRAVENOUS at 14:51

## 2021-08-27 RX ADMIN — IBUPROFEN 800 MG: 800 TABLET, FILM COATED ORAL at 20:21

## 2021-08-27 RX ADMIN — DOCUSATE SODIUM AND SENNOSIDES 2 TABLET: 8.6; 5 TABLET ORAL at 20:22

## 2021-08-27 RX ADMIN — ACETAMINOPHEN 975 MG: 325 TABLET, FILM COATED ORAL at 17:50

## 2021-08-27 RX ADMIN — KETOROLAC TROMETHAMINE 30 MG: 30 INJECTION, SOLUTION INTRAMUSCULAR; INTRAVENOUS at 08:28

## 2021-08-27 RX ADMIN — ACETAMINOPHEN 975 MG: 325 TABLET, FILM COATED ORAL at 11:53

## 2021-08-27 RX ADMIN — OXYCODONE HYDROCHLORIDE 5 MG: 5 TABLET ORAL at 00:07

## 2021-08-27 NOTE — BRIEF OP NOTE
Section Brief Op Note    Brielle Pearson  [id number]    Date of Surgery: 21     Surgeon: Naheed Triana MD    Assistants: Talisha Horner MD; Claire Oconnor MD PGY-2    Pre-operative Diagnoses:   -  at 37w4d  - Gestational Hypertension   - Asthma   - Terminal Bradycardia     Post-operative Diagnoses:   - Same, now  delivered via below stated procedure    Procedure:   - STAT Primary low transverse  section with double layer uterine closure via Pfannenstiel skin incision    Indication: Brielle Pearson is a 26 year old now  patient of the CNM service who was evaluated at bedside by this provider for terminal bradycardia. Per chart review, FHT was not tracing well at monitor at 1747 so RN went into pts' room to assess heart tones. Heart tones found in the low 100's. Pt turned from left semi-fowlers to left tilt, to semi-fowlers to right side. Oxytocin turned off. At 3 minutes into decel, Henry Rosen CNM called to bedside stat for decelerations. At 4-5 minutes of decel, HR in 100 with acceleration to 110's. At 7-10 minutes of deceleration, HR in 60's audibly but tracing intermittent. At 12 minutes, HR came up to 120's for 30 seconds and pt was complete and tried pushing. At this time this writer entered room. HR dipped immediately into trying to push and then scalp electrode was placed. Scalp was placed at 1801 and HR in 120's and quickly deceled to 90's. Given findings, CODE C/S called at 1803 with HR in 90's. Upon arrival to OR at 1806, HR in 120's. Pt attempted to push with guidance from Dr. Triana and nurses and was unable to move baby down. HR quickly deceled to 70's and stayed there until FSE was cut off at 1813 right before delivery. STAT  section was thus performed.     Anesthesia: Epidural Anesthesia     EBL: 260cc  IVF: 1900cc  UO: 600cc    Complications: None  Findings: A single liveborn female infant with Apgars of 2 and 9 was delivered from the stefan  breech presentation after rotation from cephalic to breech during elevation of head to hysterotomy. Hysterotomy with significant extension into the left broad ligament requiring Jose stitch. Bladder appeared to not be draining during case - Espinoza discovered to have deflated balloon intraoperatively. Espinoza upon initial placement with pink-tinged urine; clearer at end of case. Otherwise normal appearing uterus, fallopian tubes, ovaries.  No nuchal cord.  Clear amniontic fluid. No abdominal wall adhesions. No intraabdominal adhesions  Specimens: Cord blood/gas; Placenta    Claire Oconnor MD   OB/GYN PGY-2  08/26/21 8:04 PM

## 2021-08-27 NOTE — OP NOTE
Section Op Note     Brielle Pearson  [id number]     Date of Surgery: 21      Surgeon: Naheed Triana MD     Assistants: Talisha Horner MD; Claire Oconnor MD PGY-2     Pre-operative Diagnoses:   -  at 37w4d  - Gestational Hypertension   - Asthma   - Terminal Bradycardia      Post-operative Diagnoses:   - Same, now  delivered via below stated procedure     Procedure:   - STAT Primary low transverse  section with double layer uterine closure via Pfannenstiel skin incision     Indication: Brielle Pearson is a 26 year old now  patient of the CNM service who was evaluated at bedside by this provider for terminal bradycardia. Per chart review, FHT was not tracing well at monitor at 1747 so RN went into pts' room to assess heart tones. Heart tones found in the low 100's. Pt turned from left semi-fowlers to left tilt, to semi-fowlers to right side. Oxytocin turned off. At 3 minutes into decel, Henry Rosen CNM called to bedside stat for decelerations. At 4-5 minutes of decel, HR in 100 with acceleration to 110's. At 7-10 minutes of deceleration, HR in 60's audibly but tracing intermittent. At 12 minutes, HR came up to 120's for 30 seconds and pt was complete and tried pushing. At this time this writer entered room. HR dipped immediately into trying to push and then scalp electrode was placed. Scalp was placed at 1801 and HR in 120's and quickly deceled to 90's. Given findings, CODE C/S called at 1803 with HR in 90's. Upon arrival to OR at 1806, HR in 120's. Pt attempted to push with guidance from Dr. Triana and was unable to move baby down at all with single push attempt.   HR quickly deceled to 70's and stayed there until FSE was taken off at 1813 right before delivery. STAT  section was thus performed.   Dr Triana was scrubbed and ready for procedure when RN staff reported they were unable to place forrest due to low position of fetal head.  Multiple RN attempts.   Kamilah elevated fetal head vaginally, placed forrest with immediate return of bloody urine, exited OR to re-gown and scrub.  Forrest placement difficulty delayed procedure start by approximately 3 minutes.     Anesthesia: Epidural Anesthesia      QBL: 260cc   IVF: 1900cc  UO: 600cc     Complications: None  Findings: A single liveborn female infant with Apgars of 2 and 9 was delivered from the stefan breech presentation after rotation from cephalic to breech during elevation of head to hysterotomy. Hysterotomy with significant extension into the left and right broad ligaments requiring Jose stitch on left. Bladder appeared to not be draining during case - Forrest discovered to have deflated balloon intraoperatively. Forrest upon initial placement with bloody urine; clearer at end of case. Otherwise normal appearing uterus, fallopian tubes, ovaries.  No nuchal cord.  Clear amniontic fluid. No abdominal wall adhesions. No intraabdominal adhesions    Specimens: Cord blood/gas; Placenta  Procedure Details:  The patient was taken back to the operating room in STAT fashion at which time FHT were noted to be in the 120s as stated above. Anesthesia was then performed with an Epidural bolus. She was then prepped with betadine to the abdomen and draped in the usual sterile fashion in the dorsal supine position with a leftward tilt. Given the STAT nature of the procedure, no time out was performed performed. A pfannenstiel skin incision was made with the scalpel and carried through the underlying layer to the fascia. The fascia was incised in the midline and extended bluntly, bringing down the rectus muscles in the process. The peritoneum was then identified and entered bluntly. This was extended with blunt dissection. The bladder blade was then inserted. The very thin lower uterine segment was incised in a transverse fashion with the scalpel. The incision was extended digitally. The bladder blade was removed. The infant's head was  initially brought to the hysterotomy, at which time the fetus spun to breech. The body was thus delivered in stefan breech fashion by delivering the sacrum anterior, with delivery of the lower extremities, the trunk with rotation, and the upper extremities. The head was then delivered through the hysterotomy. During fetal delivery, primary surgeon Dr. Triana sustained a wrist injury - at which time Dr. Horner was called in to assist. The cord was clamped and cut immediately then the baby was handed off to the NICU staff. A segment of cord was taken for cord gases. The placenta was then removed with gentle traction on the cord and fundal message. The uterus was exteriorized and cleared of all clots and debris. The uterine incision was repaired with 0-Vicryl in a running locked fashion starting al lateral aspects and meeting in the middle. A significant extension was noted to the left aspect of the incision with exposed uterine vessels, requiring an [place] suture to repair. A second layer of 0-Monocryl was used to imbricate the incision. A deep pocket between the right side of the hysterotomy extending between the broad ligament and the vesicouterine peritoneum and closed with a Figure-of-Eight suture of 0-monocryl.  Surgifoam was applied to the hysterotomy. The posterior cul-de-sac was cleared of clots and debris. The uterus was returned to the abdomen and noted to be hemostatic.  The gutters were then cleared of clots. A kocher clamp was used to elevated the fascia superiorly and inferiorly and good hemostasis was noted. The fascia was closed with 0-vicryl in a running fashion. The subcutaneous tissue was irrigated and areas of bleeding were controlled with cautery. The subcutaneous tissue was closed with 2-0 [place]. The skin was closed with 4-0 Vicryl in a subcuticular fashion. The patient tolerated the procedure well and was taken to the recovery room in stable condition. A count was not performed at the  beginning of the case given the STAT nature of the procedure - however, there was a rudimentary count in a multiple of 5s that was performed and noted to be correct. A bedside XR was also performed and noted to be free of retained laps or instruments. All instrument, and sharps counts were correct times two. Dr. Triana and Dr. Horner were present and scrubbed for the procedure.     Claire Oconnor MD  Ob/Gyn Resident, PGY-2  21 8:08 PM     Physician Attestation   I spent a total of 60 minutes with the patient, personally operating as the resident assisted with STAT PLTCS for fetal bradycardia..     Key findings: As in house provider, called to CODE STAT C/S at 1803.  Arrival to OR report prolonged bradycardia. Upon arrival to OR at 1806, HR in 120's.  Cervix assessed by me and patient complete 1+ station.  Explained to patient if baby tolerates pushing and she makes significant progress, then we could try for vaginal delivery.  If no dramatic progress or fetal deceleration would immediately proceed to .  Patient voiced consent to this plan.  Pt attempted to push with guidance from me and was unable to move baby down at all with single push attempt.  HR quickly deceled to 70's and stayed there until FSE was taken off at 1813 right before delivery. STAT  section was thus performed. I was scrubbed and ready for procedure when RN staff reported they were unable to place forrest due to low position of fetal head.  Multiple RN attempts. I then elevated fetal head vaginally, placed forrest with immediate return of bloody urine, exited OR to re-gown and scrub.  Forrest placement difficulty delayed procedure start by approximately 3 minutes.  During elevation of deeply impacted fetal head I sustained wrist/lower arm injury, thus Dr. Horner asked to scrub in and take over completion of case.    Naheed Triana  Date of Service (when I saw the patient): 21

## 2021-08-27 NOTE — PROGRESS NOTES
"OB Postpartum Progress Note      S: Feeling well this morning. Pain is described as \"moderate\".  controlled. No nausea or vomiting, but has not yet had a meal. Voided a small amount after forrest was removed but straight catheterized for 600 after her void. No flatus. Has no dizziness with walking to the bathroom. Lochia appropriate.      O:  Patient Vitals for the past 24 hrs:   BP Temp Temp src Pulse Resp SpO2   08/27/21 0426 122/69 -- -- 74 16 96 %   08/27/21 0300 118/71 -- -- 77 16 98 %   08/27/21 0200 125/76 98.1  F (36.7  C) Oral 91 16 94 %   08/27/21 0056 131/74 98.5  F (36.9  C) Oral 94 16 97 %   08/26/21 2240 134/80 98.4  F (36.9  C) Oral 86 18 97 %   08/26/21 2210 (!) 141/90 99.4  F (37.4  C) Oral 76 18 99 %   08/26/21 2130 132/72 -- -- 86 -- 97 %   08/26/21 2115 135/68 -- -- 83 20 96 %   08/26/21 2100 133/77 99.5  F (37.5  C) Oral 87 18 96 %   08/26/21 2045 130/84 -- -- 101 -- 95 %   08/26/21 2030 132/80 -- -- 97 -- 97 %   08/26/21 2015 131/83 -- -- 93 -- 96 %   08/26/21 2000 117/75 -- -- 100 -- 97 %   08/26/21 1955 (!) 128/92 -- -- 90 -- 98 %   08/26/21 1950 126/73 -- -- 94 -- 98 %   08/26/21 1730 -- 98.5  F (36.9  C) Oral -- 20 95 %   08/26/21 1645 127/71 -- -- -- 20 96 %   08/26/21 1630 -- 98.4  F (36.9  C) Oral -- 20 96 %   08/26/21 1615 (!) 141/86 -- -- -- 18 97 %   08/26/21 1545 133/74 98.4  F (36.9  C) Oral 88 18 95 %   08/26/21 1452 -- 97.8  F (36.6  C) Oral -- -- --   08/26/21 1444 128/69 -- -- -- -- 97 %   08/26/21 1430 128/66 -- -- -- -- --   08/26/21 1414 111/57 -- -- -- -- --   08/26/21 1400 110/58 97.6  F (36.4  C) Oral 75 18 98 %   08/26/21 1350 123/73 -- -- -- -- 98 %   08/26/21 1330 126/78 -- -- -- -- --   08/26/21 1306 124/56 97.8  F (36.6  C) Oral -- -- 97 %   08/26/21 1300 127/67 -- -- -- -- --   08/26/21 1242 126/82 -- -- -- -- 98 %   08/26/21 1235 133/66 -- -- 89 20 98 %   08/26/21 1229 126/59 -- -- -- 18 99 %   08/26/21 1227 128/61 -- -- -- -- --   08/26/21 1225 138/64 -- -- -- -- -- "   21 1223 (!) 146/65 97.8  F (36.6  C) Oral 95 20 100 %   21 1221 (!) 148/67 -- -- -- -- --   21 1219 (!) 154/79 -- -- -- -- --   21 1217 139/75 -- -- -- -- --   21 1215 (!) 142/83 -- -- -- -- --   21 1056 -- 97.6  F (36.4  C) Oral -- -- --   21 1014 130/75 -- -- -- 22 --   21 1008 -- 98.7  F (37.1  C) Oral -- -- --   21 0844 -- 98.4  F (36.9  C) Oral -- -- --       Gen: NAD  CV: Regular rate  Resp: Non-labored breathing on room air  Abd: Soft, distended, appropriately tender, fundus firm below the umbilicus and nontender  Inc: C/D/I     Labs:   Recent Labs   Lab Test 21  0720 21  1644 21  1854 19  1444   HGB 11.2* 13.7 13.6 15.0   PLT  --  262 252 290   AST  --  22 20  --    ALT  --  19 20  --    CR  --  0.54 0.56  --        A/P: Brielle Pearson is a 26 year old  who is POD#1 s/p STAT PLTCS for terminal bradycardia. Pregnancy notable for asthma and gestational HTN. Doing well postpartum.     # Postpartum/Postop  - Pain: Continue scheduled acetaminophen, scheduled Toradol x4 doses > ibuprofen, and prn oxycodone  - Heme: Appropriate blood loss during surgery. No s/s of ongoing blood loss. AM Hgb pending.   - GI: PRN simethicone QID. PRN bowel regimen, anti-emetics  - : S/p forrest, straight cath x 1  - PNC: Rh pos, Rubella . Immune [based on [facility] records]. No interventions indicated.   - Breast feeding; no issues  - Contraception: Need to discuss recommended pregnancy spacing of 18 months.   - PPx: Encourage ambulation, IS, SCDs while confined to bed    # Gestational HTN: Blood pressures predominately normal overnight.   - Serial BP monitoring      Dispo: Anticipate discharge POD#3; once meeting postpartum discharge goals and blood pressures deemed stable.     Katey Powers MD  Obstetrics & Gynecology, PGY-3  2021 8:02 AM

## 2021-08-27 NOTE — PLAN OF CARE
Pt not tracing well at monitor at 1747 so this RN went into pts' room to assess heart tones. Heart tones found in the low 100's. Pt turned from left semi-fowlers to left tilt, to semi-fowlers to right side. Oxytocin turned off. At 3 minutes into decel, Henry Rosen CNM called to bedside stat for decelerations (she came into room at 7 minutes of decel). At 4-5 minutes of decel, HR in 100 with acceleration to 110's. At 7-10 minutes of deceleration, HR in 60's audibly but tracing intermittent. At 12 minutes, HR came up to 120's for 30 seconds and pt was found to be complete and tried pushing. HR dipped immediately into trying to push and then scalp electrode was placed. Scalp was placed at 1801 and HR in 120's and quickly deceled to 90's. CODE C/S called at 1803 with HR in 90's. Upon arrival to OR at 1806, HR in 120's. Pt atttempted to push with guidance from Dr. Triana and nurses and was unable to move baby down. HR quickly deceled to 70's and stayed there until FSE was cut off at 1813 right before delivery.  Female at 1814 Apgars 2/9.

## 2021-08-27 NOTE — ANESTHESIA POSTPROCEDURE EVALUATION
Patient: Brielle Pearson    Procedure(s):   SECTION    Diagnosis:* No pre-op diagnosis entered *  Diagnosis Additional Information: No value filed.    Anesthesia Type:  Epidural    Note:  Disposition: Inpatient   Postop Pain Control: Uneventful            Sign Out: Well controlled pain   PONV: No   Neuro/Psych: Uneventful            Sign Out: Acceptable/Baseline neuro status   Airway/Respiratory: Uneventful            Sign Out: Acceptable/Baseline resp. status   CV/Hemodynamics: Uneventful            Sign Out: Acceptable CV status; No obvious hypovolemia; No obvious fluid overload   Other NRE:    DID A NON-ROUTINE EVENT OCCUR?      Epidural-to- Updated ASA: 3      Last vitals:  Vitals Value Taken Time   /77 21 2100   Temp     Pulse 89 21   Resp     SpO2 96 % 21   Vitals shown include unvalidated device data.    Electronically Signed By: Gregory Thomas MD  2021  9:12 PM

## 2021-08-27 NOTE — PROGRESS NOTES
Late entry due to pt care  Called to room at 1757 for deceleration.   Arrived with prolonged deceleration to 70's x7 minutes with intermittent EFM tracing but audible. Brief recovery to 120 when I first arrived, then noted to be audible in the 90s.  Pt had been moved side to side, pitocin stopped, IV bolus started.  SVE per RN complete at 0 station.  Called out for MD team, Dr Oconnor arrived in room. Pt pushed once with deceleration continuing without descent and decision made for code red . Dr Siri valladares, Dr Triana in house MD here.  IFSE applied and FHT 90.  Pt brought to OR and IFSE noted to be  x60 sec with moderate variability. Pt pushed one time with Dr DEO Triana and deceleration to 70 without recovery. Stat CS performed. See OP note. JOSELINE LopezM

## 2021-08-27 NOTE — PLAN OF CARE
Data: Vital signs within normal limits. Postpartum checks within normal limits - see flow record. Patient eating and drinking normally. Patient able to empty bladder independently and is up ambulating. No apparent signs of infection. Incision healing well, covered with dressing. Patient performing self cares and is able to care for infant.  Action: Patient medicated during the shift for pain and cramping. See MAR. Patient reassessed within 1 hour after each medication and pain was improved - patient stated she was comfortable. Patient education done about hand expression, breastfeeding support and positions, postpartum cares. See flow record.  Response: Positive attachment behaviors observed with infant. Support persons Porter present.   Plan: Anticipate discharge POD 2-3.

## 2021-08-27 NOTE — PLAN OF CARE
Transfer from OB PACU to Room 7129 at 1010pm  Transferred to bed via hover mat   Brielle Pearson,  26 year old F s/p C/S and TAP Block  Anesthesia Type:  Epidural- bolused in OR  Surgeon: Dr. DEO Triana, Dr. Horner and Dr. Oconnor  Allergies: keflex  Pertinent Medical History: see medical record   QBL: 260  IVF: 500cc of LR left in full bag from OR and pt running 2nd bag of oxytocin at 100ml/hr upon transfer  UOP: 600mL in PACU   NPO: No - taking ice chips and small amounts of water well  Vomiting: No  Drainage:  none noted on dressing  Pain: 3/10- however does not feel pain if not moving around  Airway: SPO2 98% on room air at time of transfer   See PACU record for ongoing assessment, vital signs and pain assessment.

## 2021-08-27 NOTE — PROGRESS NOTES
CNM rounding. In bed breastfeeding .  at bedside. Pt is stable and voiding on her own. Very happy w care and communication from team. Feels code CS was traumatic, but needed, and she felt very supported.   Happy w healthy  daughter.  Gabby Calhoun, DNP, APRN, CNM, FACNM

## 2021-08-27 NOTE — PLAN OF CARE
Patient arrived to [facility] unit via zoom cart at 2210,with belongings, accompanied by spouse/ significant other, with infant in arms. Received report from KYLE Polanco and checked bands. Unit and room orientation completd. Call light given; no concerns present at this time. Continue with plan of care.

## 2021-08-27 NOTE — PLAN OF CARE
VSS, postpartum assessment WDL. Scant amount of lochia- abdominal dressing CDI.  Blaine H/A, vision changes, light headedness.  Ambulating independently- steady on feet.  Only able to void 100mL overnight, straight cathed for 600mL 1x.Pain managed with Tylenol, Toradol and Oxycodone.  Breastfeeding with assistance- lactation released. Did use nipple shield overnight one time, and was able to get infant to latch for approx. 15 minutes. Pt is able to make needs known,  Porter is at the bedside and very supportive- continue with POC.

## 2021-08-27 NOTE — ANESTHESIA PROCEDURE NOTES
Other Procedure Note  Pre-Procedure   Staff -        Anesthesiologist:  Kleber Talavera MD       Performed By: anesthesiologist       Location: post-op       Procedure Start/Stop Times: 8/26/2021 8:20 PM       Pre-Anesthestic Checklist: patient identified, IV checked, site marked, risks and benefits discussed, informed consent, monitors and equipment checked, pre-op evaluation, at physician/surgeon's request and post-op pain management  Timeout:       Correct Patient: Yes        Correct Procedure: Yes        Correct Site: Yes        Correct Position: Yes        Correct Laterality: Yes        Site Marked: Yes  Procedure Documentation  Procedure: Other       Diagnosis: C/S       Laterality: bilateral       Patient Position: supine       Skin prep: Chloraprep       Needle Type: short bevel       Needle Gauge: 21.        Needle Length (Inches): 4        Ultrasound guided       1. Ultrasound was used to identify targeted nerve, plexus, vascular marker, or fascial plane and place a needle adjacent to it in real-time.       2. Ultrasound was used to visualize the spread of anesthetic in close proximity to the above referenced structure.       3. A permanent image is entered into the patient's record.       4. The visualized anatomic structures appeared normal.       5. There were no apparent abnormal pathologic findings.    Assessment/Narrative         The placement was negative for: blood aspirated, painful injection and site bleeding       Paresthesias: No.     Bolus given via needle..        Secured via.        Insertion/Infusion Method: Single Shot       Complications: none    Comments:  Bilateral Quadratus Lumborum block

## 2021-08-27 NOTE — LACTATION NOTE
"This note was copied from a baby's chart.  Consult for: First time breastfeeding, early term delivery, using nipple shield.     History:   delivery for fetal status  @ 37w4d, AGA infant @ 6# 12 oz. birthweight, 19 hours old at time of visit. Maternal history of PTSD noted in , depression and anxiety, asthma, gestational HTN. Mom shares no longer using shield, wonders when this would be indicated.     Breast exam of mom: Soft, symmetric with intact, smooth nipples that jr with stimulation bilaterally.  Brielle noted early tenderness & bilateral breast growth during pregnancy.     Oral exam of baby: Feels like mild bubble palate, limited length of tongue palpable beyond lingual frenulum and dimpled at tip with certain movements. She does, however move her tongue well extending well past lower gum ridge while sucking on finger and noted past lips spontaneously. At time of visit had organized suck with NL tongue movements but minimal cupping around finger.    Feeding assessment:  Practiced with mom different positions (laid back, football holds) and reinforce her skills aiming high, getting good amount of breast in baby's mouth. Infant latched several times but sleepy, only sucking when using breast massage/compressions then only a few sucks before fall asleep or coming off. Support with hand expressing after, feed back drops via finger transfer from spoon.     Education provided: Discussed potential feeding challenges with early term infant, what to expect and ways to support baby getting enough and establishing \"full term\" milk supply. Reviewed positioning with good support, anatomy of breast and infant mouth, tips to get and maintain deeper latch, breast compressions prn to enhance milk transfer, nutritive vs. non-nutritive suck and how to hear swallows, benefits of skin to skin and feeding on cue, supply and demand, benefits of and how to do breast massage, hand expression & talked through hands on " "pumping. Encouraged pump 3-4 times daily for first week feeding back what she gets, if getting much more than baby will take back to consider decrease pumping and see LC within first week to check in on feedings & supply. Reviewed baby's second night, how to tell when satiated and if getting enough, what to expect in the coming days and preventing engorgement, breastfeeding log with when and who to call if concerns, [facility] pump cleaning handout and lactation resources for after discharge.    Feeding Plan:  Frequent skin to skin, breastfeed on cue goal of 8 to 12 times in 24 hours. Nipple shield may be indicated if infant still unable to sustain latch when she's alert and awake for feeding. Encourage breast compressions to enhance milk transfer, hand expressing after feedings until milk is in & hands on pumping 3 to 4 times daily to support \"full term\" milk supply (at least 4 times/day if using nipple shield); feed back all that mom is able to express. Follow up with outpatient lactation consultant within a week of discharge for support with early term infant, check in on milk transfer, infant weights and guidance on weaning from pumping after supply established & prn weaning from nipple shield.    "

## 2021-08-27 NOTE — ANESTHESIA CARE TRANSFER NOTE
Patient: Brielle Pearson    Procedure(s):   SECTION    Diagnosis: * No pre-op diagnosis entered *  Diagnosis Additional Information: No value filed.    Anesthesia Type:   Epidural     Note:    Oropharynx: oropharynx clear of all foreign objects and spontaneously breathing  Level of Consciousness: awake  Oxygen Supplementation: room air    Independent Airway: airway patency satisfactory and stable  Dentition: dentition unchanged  Vital Signs Stable: post-procedure vital signs reviewed and stable  Report to RN Given: handoff report given  Patient transferred to: PACU    Handoff Report: Identifed the Patient, Identified the Reponsible Provider, Reviewed the pertinent medical history, Discussed the surgical course, Reviewed Intra-OP anesthesia mangement and issues during anesthesia, Set expectations for post-procedure period and Allowed opportunity for questions and acknowledgement of understanding      Vitals:  Vitals Value Taken Time   BP     Temp     Pulse     Resp     SpO2         Electronically Signed By: JOSELNIE Gallegos CRNA  2021  7:51 PM

## 2021-08-27 NOTE — PROVIDER NOTIFICATION
08/26/21 1745   Fetal Assessment   Fetal HR Assessment Method external US   Fetal HR (beats/min) 145   Fetal Heart Baseline Rate normal range   Fetal HR Variability moderate (amplitude range 6 to 25 bpm)   Fetal HR Accelerations lasting at least 15 seconds;greater than/equal to 15 bpm   Fetal HR Decelerations absent   RN Strip Review Continuous   Fetal monitoring intermittent tracing during contractions. Audibly in 140's during first two contractions of 15 minute sequence, however RN not present for last 3 ctx in the 15 minute sequence and went in to assess at 1747 to adjust monitor.

## 2021-08-28 VITALS
RESPIRATION RATE: 16 BRPM | WEIGHT: 177.4 LBS | BODY MASS INDEX: 32.45 KG/M2 | DIASTOLIC BLOOD PRESSURE: 81 MMHG | HEART RATE: 98 BPM | SYSTOLIC BLOOD PRESSURE: 128 MMHG | OXYGEN SATURATION: 96 % | TEMPERATURE: 98.4 F

## 2021-08-28 PROCEDURE — 250N000013 HC RX MED GY IP 250 OP 250 PS 637: Performed by: STUDENT IN AN ORGANIZED HEALTH CARE EDUCATION/TRAINING PROGRAM

## 2021-08-28 RX ORDER — OXYCODONE HYDROCHLORIDE 5 MG/1
5 TABLET ORAL EVERY 6 HOURS PRN
Qty: 3 TABLET | Refills: 0 | Status: SHIPPED | OUTPATIENT
Start: 2021-08-28

## 2021-08-28 RX ORDER — OXYCODONE HYDROCHLORIDE 5 MG/1
5 TABLET ORAL EVERY 6 HOURS PRN
Qty: 3 TABLET | Refills: 0 | Status: CANCELLED | OUTPATIENT
Start: 2021-08-28 | End: 2021-08-31

## 2021-08-28 RX ADMIN — ACETAMINOPHEN 975 MG: 325 TABLET, FILM COATED ORAL at 08:26

## 2021-08-28 RX ADMIN — SIMETHICONE 80 MG: 80 TABLET, CHEWABLE ORAL at 02:34

## 2021-08-28 RX ADMIN — DOCUSATE SODIUM AND SENNOSIDES 2 TABLET: 8.6; 5 TABLET ORAL at 08:27

## 2021-08-28 RX ADMIN — IBUPROFEN 800 MG: 800 TABLET, FILM COATED ORAL at 14:22

## 2021-08-28 RX ADMIN — ACETAMINOPHEN 975 MG: 325 TABLET, FILM COATED ORAL at 02:34

## 2021-08-28 RX ADMIN — IBUPROFEN 800 MG: 800 TABLET, FILM COATED ORAL at 08:27

## 2021-08-28 RX ADMIN — ACETAMINOPHEN 975 MG: 325 TABLET, FILM COATED ORAL at 14:22

## 2021-08-28 RX ADMIN — IBUPROFEN 800 MG: 800 TABLET, FILM COATED ORAL at 02:35

## 2021-08-28 NOTE — DISCHARGE INSTRUCTIONS
Preeclampsia   Call your doctor right away if you have any of the following:  - Edema (swelling) in your face or hands  - Rapid weight gain-about 1 pound or more in a day  - Headache  - Abdominal pain on your right side  - Vision problems (flashes or spots)  - You have questions or concerns once you return home.      Discharge Instructions for  Section ()  You had a  section, or . During the , your baby was delivered through an incision in your stomach and uterus. Full recovery after a  can take time. It s important to take care of yourself -- for your own sake and because your new baby needs you. Here are some guidelines to follow at home.  Incision care  Here's how to take care of your incision:    Shower as needed. Pat your incision dry.    Watch your incision for signs of infection, like more redness or drainage.    Hold a pillow against the incision when you laugh or cough and when you get up from a lying or sitting position.    Remember, it can take as long as 6 weeks for your incision to heal.  Activity  Here are some suggestions:    Don t try to take care of anyone other than your baby and yourself.    Remember, the more active you are, the more likely you are to have an increase in your bleeding.    Get lots of rest. Take naps in the afternoon.    Increase your activities bit by bit.    Plan your activities so that you don t have to go up or down stairs more than needed.    Do postsurgical deep breathing and coughing exercises. Ask your healthcare provider for instructions.    Don t lift anything heavier than your baby until your healthcare provider tells you it s OK.    Don t drive until your healthcare provider says it s OK.    Don t have sexual intercourse until after you ve had a checkup with your healthcare provider and you have decided on a birth control method.    Allow others to do things for you. Don't hesitate to ask for help.  Follow-up  Make a  follow-up appointment as directed by our staff.  When to call your healthcare provider  Call your healthcare provider right away if you have any of these:    Fever of 100.4 F (38 C) or higher    Redness, pain, or drainage at your incision site    Bleeding that requires a new sanitary pad every hour    Severe pain in the abdomen    Pain or urgency with urination    Foul odor from vaginal discharge    Trouble urinating or emptying your bladder    No bowel movement within 1 week after the birth of your baby    Swollen, red, painful area in the leg    Appearance of rash or hives    Sore, red, painful area on the breasts that may come with flu-like symptoms    Feelings of anxiety, panic, and/or depression  [employer] last reviewed this educational content on 2/1/2018 2000-2021 [employer]. All rights reserved. This information is not intended as a substitute for professional medical care. Always follow your healthcare professional's instructions.

## 2021-08-28 NOTE — PROGRESS NOTES
OB Postpartum Progress Note      S: Feeling well this morning. Pain is controlled, has not needed oxycodone. No nausea or vomiting. Tolerating PO. Voiding spontaneously. S/p flatus and BM. Has no dizziness with walking to the bathroom. Lochia appropriate. Legs little swollen. Denies F/C, HA, vision changes, CP, SOB.    O:  Patient Vitals for the past 24 hrs:   BP Temp Temp src Pulse Resp   21 0853 128/81 98.4  F (36.9  C) Oral 98 16   21 0000 119/67 97.6  F (36.4  C) Oral 97 17   21 2045 -- 99.3  F (37.4  C) Oral -- --   21 1600 123/71 98.1  F (36.7  C) Oral 105 18   21 0932 136/73 98.1  F (36.7  C) Oral 103 18       Gen: NAD  CV: Regular rate  Resp: Non-labored breathing on room air  Abd: Soft, distended, appropriately tender, fundus firm below the umbilicus and nontender  Inc: C/D/I   Ext: 2+ edema    Labs:   Recent Labs   Lab Test 21  0720 21  1644 21  1854 19  1444   HGB 11.2* 13.7 13.6 15.0   PLT  --  262 252 290   AST  --  22 20  --    ALT  --  19 20  --    CR  --  0.54 0.56  --        A/P: Brielle Pearson is a 26 year old  who is POD#2 s/p STAT PLTCS for terminal bradycardia. Pregnancy notable for asthma and gestational HTN. Doing well postpartum.     # Postpartum/Postop  - Pain: Continue scheduled acetaminophen, scheduled Toradol x4 doses > ibuprofen, and prn oxycodone  - Heme: Acute blood loss anemia secondary to surgical blood loss, asymptomatic.  No s/s of ongoing blood loss. AM Hgb pending.   - GI: PRN simethicone QID. PRN bowel regimen, anti-emetics  - : S/p forrest, straight cath x 1 >> voiding spontaneously  - PNC: Rh pos, Rubella . Immune [based on [facility] records]. No interventions indicated.   - Breast feeding; no issues  - Contraception: Declines. Discuss recommended pregnancy spacing of 18 months.   - PPx: Encourage ambulation, IS, SCDs while confined to bed    # Gestational HTN:   - Blood pressures wnl  - Serial BP monitoring    - 1wk  BP check + cuff    Dispo: Room in POD#2 vs Discharge POD#3 (pending baby's status)    Javier Arcos MD MPH  OB/Gyn PGY-3  08/28/21 9:16 AM    Staff MD Note    I appreciate the note by Dr. Arcos.  Any necessary changes have been made by me.  I saw and evaluated the patient and agree with the findings and plan of care as documented in the note.  Doing well, meeting postop goals, + BM this AM.  BP stable, no s/sx preeclampsia.  Went over discharge instructions today.  Will arrange followup care for patient at our clinic, staff message sent.  BP, incision and mood check in 1 week, PP visit in 6 weeks.    Nithya Green MD

## 2021-08-28 NOTE — PROGRESS NOTES
CNM Courtesy Round:    Patient Name:  Brielle Pearson  :      1995  MRN:      [id number]    Assessment:   25 yo    Day 2 postpartum, s/p STAT  for fetal intolerance of labor  Transferred from [facility] for gestational hypertension  Normotensive postpartum  Breastfeeding established, using nipple shield  No history of depression/anxiety  Undecided regarding postpartum contraception  CNM courtesy round.    Plan:    - Discussed breast care, pain management, breastfeeding initiation, bowel changes, return of fertility, postpartum exercises, postpartum mood changes and adjustments, postpartum blues vs. postpartum depression, sleep changes and required support.   -Plan for today: encouraged rest, skin-to-skin, breastfeeding on cue, adequate pain control and limiting visitors.   -Discussed postpartum contraception: Reviewed options, will discuss further at 6 week pp visit. Discussed our recommendation that she gives her uterus adequate time to heal in between her births. 18 months from birth to birth is what is recommended. Discussed that we recommend that she use a reliable birth control option to decrease the chance that she conceives before this time.   -Reviewed the challenge adapting after an emergent  section and gave space for discussion around emotional changes related to her experience. Encouraged Brielle to reach out if she is developing postpartum depression or anxiety.  -Discussed that utilizing a nipple shield can decrease the stimulation her breasts, and decrease the hormonal message to her brain to make milk. Discussed that if she continues to use a nipple shield she may want to try to pump a few times a day to promote milk production. Encouraged establishing care with a lactation consultant.   Offered resources.   -Discussed that patient is under physician management with CNM support as needed.    Subjective:  Integrating birth experience. Brielle is adjusting to the  "shock of an emergent  section. She is grateful that her daughter \"Melissa\" is doing as well as she is. She is feeling much better physically as well. Pleased with her care. The patient is voiding and ambulating without difficulty.   Pain is well controlled with current medications.   Baby Melissa is feeding on cue with a nipple shield due to Brielle's inverted nipples. Brielle feels like she is getting the hang of getting the baby to latch with a nipple shield. She does have lactation support from [facility] and reports they will come to her home to offer support.   Postpartum contraception plans: Brielle shares that she has been on \"birth control for a long time\" and wants to give her body a break from hormones.   Patient denies history of depression.     Objective:  No exam. Courtesy round only.          Britany Butt CNM, JOSELINE  [facility]  2021 12:30 PM    "

## 2021-08-28 NOTE — PLAN OF CARE
VSS, postpartum assessment WNL, voiding without difficulty, passing flatus.  Pain managed with ibuprofen, tylenol and occasional oxycodone.  Encouraged to complete birth certificate and EDS.  Appropriate bonding observed with infant.  Denies questions or concerns at this time.  Continue with current plan of care.

## 2021-08-28 NOTE — PLAN OF CARE
Data: Vital signs within normal limits. Postpartum checks within normal limits - see flow record. Patient eating and drinking normally. Patient able to empty bladder independently and is up ambulating. No apparent signs of infection. Incision healing well. Patient performing self cares and is able to care for infant.  Action: Patient medicated during the shift for pain and cramping. See MAR. Patient reassessed within 1 hour after each medication and pain was improved - patient stated she was comfortable. Patient education done about discharge instructions, follow up, warning signs. See flow record.  Response: Positive attachment behaviors observed with infant. Support persons Porter present.   Plan: Anticipate discharge today at 1630.

## 2021-08-29 ENCOUNTER — MEDICAL CORRESPONDENCE (OUTPATIENT)
Dept: HEALTH INFORMATION MANAGEMENT | Facility: CLINIC | Age: 26
End: 2021-08-29

## 2021-08-31 NOTE — PROGRESS NOTES
"[facility]  2021    Reason For Visit: CS incision check, BP check    S: Brielle Pearson is a 26 year old  here for 1 week follow up CS incision check after STAT PLTCS on 21 for terminal fetal bradycardia. Procedure was complicated by bilateral extensions requiring O'Elsie suture, but otherwise was uncomplicated. She recovered as expected in the postpartum period and was meeting goals for discharge home on POD#2. Pregnancy had been complicated by gHTN and GBS positive status, no issues with BP control postpartum.     Today she reports she is doing well. Pain is minimal. Voiding appropriately and having regular BMs. Hasn't noticed any erythema or drainage from the incision. Denies any HA, vision changes, CP, SOB, RUQ pain, or significant edema. Baby is doing well, required readmission for jaundice. Feeling emotionally well supported after \"intense\" delivery. Has good milk supply, working on latch with lactation.       OBGYN Hx  Pap Smears: Last pap 2018, NILM  Contraception: undecided, prefers no hormones    O:   BP (!) 156/97   Ht 1.575 m (5' 2\")   LMP 2020 (Exact Date)   BMI 32.45 kg/m    Exam:  Gen: Well appearing, NAD  CV: Warm and well perfused   Resp: On room air, no increased work of breathing  Abd: Soft, non-tender   Incision: Clean, dry, intact, no erythema or drainage. Steri strips in place and removed today.   : Deferred    A: 26 year old year old  here for CS incision check after STAT PLTCS on  for terminal fetal bradycardia. Pregnancy complicated by gHTN and GBS positive status. Doing well in the postpartum period, incision appears to be healing well. BP today high mild range. Reviewed gestational hypertensive and risk of postpartum pre-eclampsia.      P:   # Gestational HTN  - High MR pressures today - Rx for nifedipine 30 mg daily given   - Encouraged to check BP at least once daily, more if symptoms or elevated  - BP goals and pre-eclampsia signs and " symptoms reviewed today   - Pre-eclampsia labs drawn today, mildly elevated ALT with normal AST, Cr, Plt, ,and Hgb. Will plan to repeat labs next week  - Follow up in clinic in 1 week for BP check, medication titration, repeat labs    # Postpartum care  - Pain: well controlled  - Bleeding: minimal  - Breastfeeding: good milk supply, working on latch   - Mood: stable, reviewed postpartum depression warning signs  - Contraception: undecided, interested in non-hormonal options, reviewed Paragard IUD, condoms, natural family planning and recommendation for 18 month pregnancy spacing. Also discussed low systemic dose of progesterone IUDs    RTC 1 week for BP check, repeat labs.     Patient staffed with Dr. Triana.       Carla Singer MD  OB/GYN Resident PGY-2  5:55 PM September 1, 2021        The Patient was seen in Resident Continuity Clinic by CARLA SINGER.  I reviewed the history & exam. Assessment and plan were jointly made.    Azucena Triana MD

## 2021-09-01 ENCOUNTER — OFFICE VISIT (OUTPATIENT)
Dept: OBGYN | Facility: CLINIC | Age: 26
End: 2021-09-01
Payer: COMMERCIAL

## 2021-09-01 ENCOUNTER — LAB (OUTPATIENT)
Dept: LAB | Facility: CLINIC | Age: 26
End: 2021-09-01
Payer: COMMERCIAL

## 2021-09-01 VITALS — BODY MASS INDEX: 32.45 KG/M2 | SYSTOLIC BLOOD PRESSURE: 156 MMHG | HEIGHT: 62 IN | DIASTOLIC BLOOD PRESSURE: 97 MMHG

## 2021-09-01 DIAGNOSIS — O13.9 GESTATIONAL HYPERTENSION, ANTEPARTUM: Primary | ICD-10-CM

## 2021-09-01 DIAGNOSIS — O13.9 GESTATIONAL HYPERTENSION, ANTEPARTUM: ICD-10-CM

## 2021-09-01 LAB
ALT SERPL W P-5'-P-CCNC: 69 U/L (ref 0–50)
AST SERPL W P-5'-P-CCNC: 34 U/L (ref 0–45)
CREAT SERPL-MCNC: 0.64 MG/DL (ref 0.52–1.04)
GFR SERPL CREATININE-BSD FRML MDRD: >90 ML/MIN/1.73M2
HGB BLD-MCNC: 11.8 G/DL (ref 11.7–15.7)
PLATELET # BLD AUTO: 416 10E3/UL (ref 150–450)

## 2021-09-01 PROCEDURE — 84460 ALANINE AMINO (ALT) (SGPT): CPT

## 2021-09-01 PROCEDURE — 84450 TRANSFERASE (AST) (SGOT): CPT

## 2021-09-01 PROCEDURE — 82565 ASSAY OF CREATININE: CPT

## 2021-09-01 PROCEDURE — 99207 PR POST PARTUM EXAM: CPT | Mod: GE | Performed by: OBSTETRICS & GYNECOLOGY

## 2021-09-01 PROCEDURE — 85049 AUTOMATED PLATELET COUNT: CPT

## 2021-09-01 PROCEDURE — G0463 HOSPITAL OUTPT CLINIC VISIT: HCPCS

## 2021-09-01 PROCEDURE — 85018 HEMOGLOBIN: CPT

## 2021-09-01 PROCEDURE — 36415 COLL VENOUS BLD VENIPUNCTURE: CPT

## 2021-09-01 RX ORDER — NIFEDIPINE 30 MG/1
30 TABLET, EXTENDED RELEASE ORAL DAILY
Qty: 30 TABLET | Refills: 0 | Status: SHIPPED | OUTPATIENT
Start: 2021-09-01

## 2021-09-09 ENCOUNTER — OFFICE VISIT (OUTPATIENT)
Dept: OBGYN | Facility: CLINIC | Age: 26
End: 2021-09-09
Payer: COMMERCIAL

## 2021-09-09 VITALS
HEART RATE: 121 BPM | SYSTOLIC BLOOD PRESSURE: 140 MMHG | BODY MASS INDEX: 29.04 KG/M2 | DIASTOLIC BLOOD PRESSURE: 83 MMHG | WEIGHT: 158.8 LBS

## 2021-09-09 DIAGNOSIS — O13.9 GESTATIONAL HYPERTENSION, ANTEPARTUM: Primary | ICD-10-CM

## 2021-09-09 LAB
ALT SERPL W P-5'-P-CCNC: 35 U/L (ref 0–50)
AST SERPL W P-5'-P-CCNC: 20 U/L (ref 0–45)
CREAT SERPL-MCNC: 0.69 MG/DL (ref 0.52–1.04)
ERYTHROCYTE [DISTWIDTH] IN BLOOD BY AUTOMATED COUNT: 12.9 % (ref 10–15)
GFR SERPL CREATININE-BSD FRML MDRD: >90 ML/MIN/1.73M2
HCT VFR BLD AUTO: 43.6 % (ref 35–47)
HGB BLD-MCNC: 14.6 G/DL (ref 11.7–15.7)
MCH RBC QN AUTO: 28.2 PG (ref 26.5–33)
MCHC RBC AUTO-ENTMCNC: 33.5 G/DL (ref 31.5–36.5)
MCV RBC AUTO: 84 FL (ref 78–100)
PLATELET # BLD AUTO: 515 10E3/UL (ref 150–450)
RBC # BLD AUTO: 5.17 10E6/UL (ref 3.8–5.2)
WBC # BLD AUTO: 8.8 10E3/UL (ref 4–11)

## 2021-09-09 PROCEDURE — G0463 HOSPITAL OUTPT CLINIC VISIT: HCPCS

## 2021-09-09 PROCEDURE — 36415 COLL VENOUS BLD VENIPUNCTURE: CPT | Performed by: STUDENT IN AN ORGANIZED HEALTH CARE EDUCATION/TRAINING PROGRAM

## 2021-09-09 PROCEDURE — 99207 PR POST PARTUM EXAM: CPT | Mod: GE | Performed by: OBSTETRICS & GYNECOLOGY

## 2021-09-09 PROCEDURE — 85027 COMPLETE CBC AUTOMATED: CPT | Performed by: STUDENT IN AN ORGANIZED HEALTH CARE EDUCATION/TRAINING PROGRAM

## 2021-09-09 PROCEDURE — 84450 TRANSFERASE (AST) (SGOT): CPT | Performed by: STUDENT IN AN ORGANIZED HEALTH CARE EDUCATION/TRAINING PROGRAM

## 2021-09-09 PROCEDURE — 84460 ALANINE AMINO (ALT) (SGPT): CPT | Performed by: STUDENT IN AN ORGANIZED HEALTH CARE EDUCATION/TRAINING PROGRAM

## 2021-09-09 PROCEDURE — 82565 ASSAY OF CREATININE: CPT | Performed by: STUDENT IN AN ORGANIZED HEALTH CARE EDUCATION/TRAINING PROGRAM

## 2021-09-09 NOTE — PROGRESS NOTES
[facility]  2021    Reason For Visit: BP check    S: Brielle Pearson is a 26 year old  here for 2 week follow up CS incision check after STAT PLTCS on 21 for terminal fetal bradycardia. Procedure was complicated by bilateral extensions requiring O'Purdon suture, but otherwise was uncomplicated. She recovered as expected in the postpartum period and was meeting goals for discharge home on POD#2. Pregnancy had been complicated by gHTN and GBS positive status, no issues with BP control postpartum.     She was last seen by me on  for incision check and BP check. At that time was noted to have high mild range pressures of 150s/90s. She was started on nifedipine 90 mg daily and advised to check BP at home once daily. Pre-eclampsia labs drawn and showed mildly elevated ALT, but not meeting criteria for pre-eclampsia with severe features. Today she reports she is doing well. Denies any HA, vision changes, CP, SOB, RUQ pain, or significant edema. Has blood pressure log with her today, all are <150/<90. Lowest 108/88.      Otherwise doing well from a postpartum perspective. Pain has mostly resolved, bleeding improved. Milk supply is good and mood is stable. Wonders about what exercises she can start doing again.     OBGYN Hx  Pap Smears: Last pap 2018, NILM  Contraception: undecided, prefers no hormones    O:   BP (!) 140/83   Pulse (!) 121   Wt 72 kg (158 lb 12.8 oz)   LMP 2020 (Exact Date)   BMI 29.04 kg/m    Exam:  Gen: Well appearing, NAD  CV: Warm and well perfused   Resp: On room air, no increased work of breathing  Abd: Soft, non-tender   Incision: Clean, dry, intact, no erythema or drainage. Steri strips in place and removed today.   : Deferred    A: 26 year old year old  here for CS incision check after STAT PLTCS on  for terminal fetal bradycardia. Pregnancy complicated by gHTN and GBS positive status. Started on nifedipine 30 mg daily on  due to high mild range  pressures on BP check. BP at home are normal or low mild range, today 140/83.     P:   # Gestational HTN  - Continue nifedipine 30 mg daily for 30 days (complete Rx), will call or MyChart with BP log at that time and can write refill Rx if BP are still predominantly mild range. Suspect that BP will have improved enough to discontinue at that time.   - Encouraged to check BP at least once daily  - BP goals and pre-eclampsia signs and symptoms reviewed today   - Pre-eclampsia labs drawn at last visit, mildly elevated ALT. Repeat labs today. Okay to MyChart with results.   - Follow up in clinic in 4 weeks for routine 6 wk postpartum visit    # Postpartum care  - Pain: well controlled  - Bleeding: minimal  - Breastfeeding: good milk supply, working on latch   - Mood: stable, reviewed postpartum depression warning signs  - Contraception: undecided, interested in non-hormonal options, previously reviewed Paragard IUD, condoms, natural family planning and recommendation for 18 month pregnancy spacing. Also discussed low systemic dose of progesterone IUDs  - Exercise: discussed increasing walks, body weight exercises, no lifting >15 lbs, limiting core exercise.     RTC 4 weeks for routine 6 wk visit.    Patient staffed with Dr. Yu.      Carla Singer MD  OB/GYN Resident PGY-2  1:35 PM September 9, 2021       The Patient was seen in Resident Continuity Clinic by CARLA SINGER.  I reviewed the history & exam. Assessment and plan were jointly made.    Dolly Yu MD

## 2021-09-10 ENCOUNTER — MYC MEDICAL ADVICE (OUTPATIENT)
Dept: OBGYN | Facility: CLINIC | Age: 26
End: 2021-09-10

## 2021-09-13 NOTE — TELEPHONE ENCOUNTER
No concern with platelets, I think just a reflection of recovery after delivery/HELLP. She can hold the nifedipine but I would appreciate if she could check her BP in the morning for the next few days and then let us know - maybe a phone visit w you all on Friday to report her BP or an in person visit for a BP check?   Thanks,

## 2021-09-13 NOTE — TELEPHONE ENCOUNTER
Called patient this AM.  She had Bps on 9/11 of 121/75 and 116/77.  On 9/12 108/74.  She has had a mild headache, not bad enough to stop her from her usual routine.  She is not using tylenol or ibuprofen.  Feels that she is staying well hydrated.    Also concerned that her platelets are slightly elevated.    Routed to MD for review.

## 2021-09-17 ENCOUNTER — TELEPHONE (OUTPATIENT)
Dept: OBGYN | Facility: CLINIC | Age: 26
End: 2021-09-17

## 2021-09-17 NOTE — TELEPHONE ENCOUNTER
----- Message from Aiyana Piper RN sent at 9/17/2021 10:40 AM CDT -----  Regarding: BP readings

## 2021-09-17 NOTE — TELEPHONE ENCOUNTER
Stopped BP meds on Tuesday.  Wednesday 9/15/21 reading was 110/88, even was 103/77.  Thurs 9/16/21 /77, and this morning 122/71.  All within normal. Plan to keep visit as scheduled 10/14/21

## 2021-09-18 ENCOUNTER — MYC MEDICAL ADVICE (OUTPATIENT)
Dept: OBGYN | Facility: CLINIC | Age: 26
End: 2021-09-18

## 2021-09-18 ENCOUNTER — HEALTH MAINTENANCE LETTER (OUTPATIENT)
Age: 26
End: 2021-09-18

## 2022-01-08 ENCOUNTER — HEALTH MAINTENANCE LETTER (OUTPATIENT)
Age: 27
End: 2022-01-08

## 2022-11-19 ENCOUNTER — HEALTH MAINTENANCE LETTER (OUTPATIENT)
Age: 27
End: 2022-11-19

## 2022-11-19 ENCOUNTER — MYC MEDICAL ADVICE (OUTPATIENT)
Dept: OBGYN | Facility: CLINIC | Age: 27
End: 2022-11-19

## 2023-04-09 ENCOUNTER — HEALTH MAINTENANCE LETTER (OUTPATIENT)
Age: 28
End: 2023-04-09

## 2024-06-16 ENCOUNTER — HEALTH MAINTENANCE LETTER (OUTPATIENT)
Age: 29
End: 2024-06-16

## (undated) DEVICE — GLOVE PROTEXIS BLUE W/NEU-THERA 7.0  2D73EB70

## (undated) DEVICE — SU VICRYL 4-0 KS 27" UND J662H

## (undated) DEVICE — PACK C-SECTION LF PL15OTA83B

## (undated) DEVICE — SU MONOCRYL 0 CT-1 36" Y346H

## (undated) DEVICE — ESU GROUND PAD UNIVERSAL W/O CORD

## (undated) DEVICE — STOCKING SLEEVE COMPRESSION CALF LG

## (undated) DEVICE — STRAP KNEE/BODY 31143004

## (undated) DEVICE — SOL WATER IRRIG 1000ML BOTTLE 07139-09

## (undated) DEVICE — PREP CHLORAPREP 26ML TINTED ORANGE  260815

## (undated) DEVICE — GLOVE ESTEEM POWDER FREE SMT 6.5  2D72PT65

## (undated) DEVICE — DRSG ABDOMINAL 07 1/2X8" 7197D

## (undated) DEVICE — SOL NACL 0.9% IRRIG 1000ML BOTTLE 07138-09

## (undated) DEVICE — CATH TRAY FOLEY SURESTEP 16FR WDRAIN BAG STLK LATEX A300316A

## (undated) DEVICE — SU VICRYL 0 CT-1 36" J346H

## (undated) DEVICE — DRSG STERI STRIP 1/4X3" R1541

## (undated) RX ORDER — PHENYLEPHRINE HCL IN 0.9% NACL 50MG/250ML
PLASTIC BAG, INJECTION (ML) INTRAVENOUS
Status: DISPENSED
Start: 2021-08-26

## (undated) RX ORDER — ONDANSETRON 2 MG/ML
INJECTION INTRAMUSCULAR; INTRAVENOUS
Status: DISPENSED
Start: 2021-08-26

## (undated) RX ORDER — CEFAZOLIN SODIUM 1 G/3ML
INJECTION, POWDER, FOR SOLUTION INTRAMUSCULAR; INTRAVENOUS
Status: DISPENSED
Start: 2021-08-26

## (undated) RX ORDER — TRANEXAMIC ACID 10 MG/ML
INJECTION, SOLUTION INTRAVENOUS
Status: DISPENSED
Start: 2021-08-26

## (undated) RX ORDER — LIDOCAINE HYDROCHLORIDE 20 MG/ML
INJECTION, SOLUTION EPIDURAL; INFILTRATION; INTRACAUDAL; PERINEURAL
Status: DISPENSED
Start: 2021-08-26

## (undated) RX ORDER — KETOROLAC TROMETHAMINE 30 MG/ML
INJECTION, SOLUTION INTRAMUSCULAR; INTRAVENOUS
Status: DISPENSED
Start: 2021-08-26

## (undated) RX ORDER — OXYTOCIN/0.9 % SODIUM CHLORIDE 30/500 ML
PLASTIC BAG, INJECTION (ML) INTRAVENOUS
Status: DISPENSED
Start: 2021-08-26